# Patient Record
Sex: MALE | Race: WHITE | NOT HISPANIC OR LATINO | Employment: FULL TIME | URBAN - METROPOLITAN AREA
[De-identification: names, ages, dates, MRNs, and addresses within clinical notes are randomized per-mention and may not be internally consistent; named-entity substitution may affect disease eponyms.]

---

## 2018-02-08 ENCOUNTER — AMBULATORY - HEALTHEAST (OUTPATIENT)
Dept: INTERNAL MEDICINE | Facility: CLINIC | Age: 78
End: 2018-02-08

## 2018-02-09 ENCOUNTER — AMBULATORY - HEALTHEAST (OUTPATIENT)
Dept: INTERNAL MEDICINE | Facility: CLINIC | Age: 78
End: 2018-02-09

## 2018-02-09 ENCOUNTER — OFFICE VISIT - HEALTHEAST (OUTPATIENT)
Dept: INTERNAL MEDICINE | Facility: CLINIC | Age: 78
End: 2018-02-09

## 2018-02-09 DIAGNOSIS — E03.9 HYPOTHYROID: ICD-10-CM

## 2018-02-09 DIAGNOSIS — I44.7 LEFT BUNDLE BRANCH BLOCK: ICD-10-CM

## 2018-02-09 DIAGNOSIS — Z12.5 SCREENING FOR PROSTATE CANCER: ICD-10-CM

## 2018-02-09 DIAGNOSIS — Z00.00 ROUTINE GENERAL MEDICAL EXAMINATION AT A HEALTH CARE FACILITY: ICD-10-CM

## 2018-02-09 LAB
ALBUMIN SERPL-MCNC: 3.7 G/DL (ref 3.5–5)
ALBUMIN UR-MCNC: NEGATIVE MG/DL
ALP SERPL-CCNC: 54 U/L (ref 45–120)
ALT SERPL W P-5'-P-CCNC: 14 U/L (ref 0–45)
ANION GAP SERPL CALCULATED.3IONS-SCNC: 9 MMOL/L (ref 5–18)
APPEARANCE UR: CLEAR
AST SERPL W P-5'-P-CCNC: 19 U/L (ref 0–40)
ATRIAL RATE - MUSE: 65 BPM
BACTERIA #/AREA URNS HPF: ABNORMAL HPF
BILIRUB SERPL-MCNC: 1.2 MG/DL (ref 0–1)
BILIRUB UR QL STRIP: NEGATIVE
BUN SERPL-MCNC: 16 MG/DL (ref 8–28)
CALCIUM SERPL-MCNC: 9 MG/DL (ref 8.5–10.5)
CHLORIDE BLD-SCNC: 107 MMOL/L (ref 98–107)
CHOLEST SERPL-MCNC: 216 MG/DL
CO2 SERPL-SCNC: 26 MMOL/L (ref 22–31)
COLOR UR AUTO: YELLOW
CREAT SERPL-MCNC: 0.94 MG/DL (ref 0.7–1.3)
DIASTOLIC BLOOD PRESSURE - MUSE: NORMAL MMHG
ERYTHROCYTE [DISTWIDTH] IN BLOOD BY AUTOMATED COUNT: 11.5 % (ref 11–14.5)
FASTING STATUS PATIENT QL REPORTED: YES
GFR SERPL CREATININE-BSD FRML MDRD: >60 ML/MIN/1.73M2
GLUCOSE BLD-MCNC: 94 MG/DL (ref 70–125)
GLUCOSE UR STRIP-MCNC: NEGATIVE MG/DL
HCT VFR BLD AUTO: 47 % (ref 40–54)
HDLC SERPL-MCNC: 45 MG/DL
HGB BLD-MCNC: 15.8 G/DL (ref 14–18)
HGB UR QL STRIP: ABNORMAL
INTERPRETATION ECG - MUSE: NORMAL
KETONES UR STRIP-MCNC: NEGATIVE MG/DL
LDLC SERPL CALC-MCNC: 148 MG/DL
LEUKOCYTE ESTERASE UR QL STRIP: NEGATIVE
MCH RBC QN AUTO: 31.7 PG (ref 27–34)
MCHC RBC AUTO-ENTMCNC: 33.5 G/DL (ref 32–36)
MCV RBC AUTO: 94 FL (ref 80–100)
NITRATE UR QL: NEGATIVE
P AXIS - MUSE: 63 DEGREES
PH UR STRIP: 6 [PH] (ref 5–8)
PLATELET # BLD AUTO: 202 THOU/UL (ref 140–440)
PMV BLD AUTO: 7.7 FL (ref 7–10)
POTASSIUM BLD-SCNC: 4.3 MMOL/L (ref 3.5–5)
PR INTERVAL - MUSE: 174 MS
PROT SERPL-MCNC: 6.9 G/DL (ref 6–8)
PSA SERPL-MCNC: <0.1 NG/ML (ref 0–6.5)
QRS DURATION - MUSE: 158 MS
QT - MUSE: 440 MS
QTC - MUSE: 457 MS
R AXIS - MUSE: -25 DEGREES
RBC # BLD AUTO: 4.97 MILL/UL (ref 4.4–6.2)
RBC #/AREA URNS AUTO: ABNORMAL HPF
SODIUM SERPL-SCNC: 142 MMOL/L (ref 136–145)
SP GR UR STRIP: 1.02 (ref 1–1.03)
SQUAMOUS #/AREA URNS AUTO: ABNORMAL LPF
SYSTOLIC BLOOD PRESSURE - MUSE: NORMAL MMHG
T AXIS - MUSE: 49 DEGREES
TRIGL SERPL-MCNC: 113 MG/DL
TSH SERPL DL<=0.005 MIU/L-ACNC: 2.01 UIU/ML (ref 0.3–5)
UROBILINOGEN UR STRIP-ACNC: ABNORMAL
VENTRICULAR RATE- MUSE: 65 BPM
WBC #/AREA URNS AUTO: ABNORMAL HPF
WBC: 7.9 THOU/UL (ref 4–11)

## 2018-02-09 ASSESSMENT — MIFFLIN-ST. JEOR: SCORE: 1942.09

## 2018-02-15 ENCOUNTER — COMMUNICATION - HEALTHEAST (OUTPATIENT)
Dept: INTERNAL MEDICINE | Facility: CLINIC | Age: 78
End: 2018-02-15

## 2018-12-04 ENCOUNTER — RECORDS - HEALTHEAST (OUTPATIENT)
Dept: ADMINISTRATIVE | Facility: OTHER | Age: 78
End: 2018-12-04

## 2019-01-24 ENCOUNTER — RECORDS - HEALTHEAST (OUTPATIENT)
Dept: ADMINISTRATIVE | Facility: OTHER | Age: 79
End: 2019-01-24

## 2019-02-11 ENCOUNTER — OFFICE VISIT - HEALTHEAST (OUTPATIENT)
Dept: INTERNAL MEDICINE | Facility: CLINIC | Age: 79
End: 2019-02-11

## 2019-02-11 DIAGNOSIS — Z00.00 ROUTINE GENERAL MEDICAL EXAMINATION AT A HEALTH CARE FACILITY: ICD-10-CM

## 2019-02-11 DIAGNOSIS — Z12.5 SCREENING FOR PROSTATE CANCER: ICD-10-CM

## 2019-02-11 DIAGNOSIS — I44.7 LEFT BUNDLE BRANCH BLOCK: ICD-10-CM

## 2019-02-11 LAB
ALBUMIN SERPL-MCNC: 3.8 G/DL (ref 3.5–5)
ALBUMIN UR-MCNC: NEGATIVE MG/DL
ALP SERPL-CCNC: 52 U/L (ref 45–120)
ALT SERPL W P-5'-P-CCNC: 13 U/L (ref 0–45)
ANION GAP SERPL CALCULATED.3IONS-SCNC: 7 MMOL/L (ref 5–18)
APPEARANCE UR: CLEAR
AST SERPL W P-5'-P-CCNC: 18 U/L (ref 0–40)
ATRIAL RATE - MUSE: 63 BPM
BILIRUB SERPL-MCNC: 1.2 MG/DL (ref 0–1)
BILIRUB UR QL STRIP: ABNORMAL
BUN SERPL-MCNC: 17 MG/DL (ref 8–28)
CALCIUM SERPL-MCNC: 9.3 MG/DL (ref 8.5–10.5)
CHLORIDE BLD-SCNC: 106 MMOL/L (ref 98–107)
CHOLEST SERPL-MCNC: 190 MG/DL
CO2 SERPL-SCNC: 27 MMOL/L (ref 22–31)
COLOR UR AUTO: YELLOW
CREAT SERPL-MCNC: 1.02 MG/DL (ref 0.7–1.3)
DIASTOLIC BLOOD PRESSURE - MUSE: NORMAL MMHG
ERYTHROCYTE [DISTWIDTH] IN BLOOD BY AUTOMATED COUNT: 11.4 % (ref 11–14.5)
FASTING STATUS PATIENT QL REPORTED: YES
GFR SERPL CREATININE-BSD FRML MDRD: >60 ML/MIN/1.73M2
GLUCOSE BLD-MCNC: 98 MG/DL (ref 70–125)
GLUCOSE UR STRIP-MCNC: NEGATIVE MG/DL
HCT VFR BLD AUTO: 46.1 % (ref 40–54)
HDLC SERPL-MCNC: 48 MG/DL
HGB BLD-MCNC: 16.1 G/DL (ref 14–18)
HGB UR QL STRIP: NEGATIVE
INTERPRETATION ECG - MUSE: NORMAL
KETONES UR STRIP-MCNC: NEGATIVE MG/DL
LDLC SERPL CALC-MCNC: 124 MG/DL
LEUKOCYTE ESTERASE UR QL STRIP: NEGATIVE
MCH RBC QN AUTO: 32.1 PG (ref 27–34)
MCHC RBC AUTO-ENTMCNC: 35 G/DL (ref 32–36)
MCV RBC AUTO: 92 FL (ref 80–100)
NITRATE UR QL: NEGATIVE
P AXIS - MUSE: 70 DEGREES
PH UR STRIP: 7 [PH] (ref 5–8)
PLATELET # BLD AUTO: 203 THOU/UL (ref 140–440)
PMV BLD AUTO: 7.7 FL (ref 7–10)
POTASSIUM BLD-SCNC: 4.3 MMOL/L (ref 3.5–5)
PR INTERVAL - MUSE: 164 MS
PROT SERPL-MCNC: 6.8 G/DL (ref 6–8)
PSA SERPL-MCNC: <0.1 NG/ML (ref 0–6.5)
QRS DURATION - MUSE: 152 MS
QT - MUSE: 442 MS
QTC - MUSE: 452 MS
R AXIS - MUSE: -25 DEGREES
RBC # BLD AUTO: 5.02 MILL/UL (ref 4.4–6.2)
SODIUM SERPL-SCNC: 140 MMOL/L (ref 136–145)
SP GR UR STRIP: 1.02 (ref 1–1.03)
SYSTOLIC BLOOD PRESSURE - MUSE: NORMAL MMHG
T AXIS - MUSE: 76 DEGREES
TRIGL SERPL-MCNC: 89 MG/DL
TSH SERPL DL<=0.005 MIU/L-ACNC: 1.48 UIU/ML (ref 0.3–5)
UROBILINOGEN UR STRIP-ACNC: ABNORMAL
VENTRICULAR RATE- MUSE: 63 BPM
WBC: 6.5 THOU/UL (ref 4–11)

## 2019-02-11 ASSESSMENT — MIFFLIN-ST. JEOR: SCORE: 1909.77

## 2019-02-12 ENCOUNTER — COMMUNICATION - HEALTHEAST (OUTPATIENT)
Dept: INTERNAL MEDICINE | Facility: CLINIC | Age: 79
End: 2019-02-12

## 2019-07-25 ENCOUNTER — RECORDS - HEALTHEAST (OUTPATIENT)
Dept: ADMINISTRATIVE | Facility: OTHER | Age: 79
End: 2019-07-25

## 2019-11-06 ENCOUNTER — COMMUNICATION - HEALTHEAST (OUTPATIENT)
Dept: SCHEDULING | Facility: CLINIC | Age: 79
End: 2019-11-06

## 2019-11-11 ENCOUNTER — RECORDS - HEALTHEAST (OUTPATIENT)
Dept: ADMINISTRATIVE | Facility: OTHER | Age: 79
End: 2019-11-11

## 2019-11-27 ENCOUNTER — RECORDS - HEALTHEAST (OUTPATIENT)
Dept: ADMINISTRATIVE | Facility: OTHER | Age: 79
End: 2019-11-27

## 2020-02-07 ENCOUNTER — RECORDS - HEALTHEAST (OUTPATIENT)
Dept: ADMINISTRATIVE | Facility: OTHER | Age: 80
End: 2020-02-07

## 2020-02-14 ENCOUNTER — OFFICE VISIT - HEALTHEAST (OUTPATIENT)
Dept: INTERNAL MEDICINE | Facility: CLINIC | Age: 80
End: 2020-02-14

## 2020-02-14 DIAGNOSIS — Z12.5 SCREENING FOR PROSTATE CANCER: ICD-10-CM

## 2020-02-14 DIAGNOSIS — Z00.00 ROUTINE GENERAL MEDICAL EXAMINATION AT A HEALTH CARE FACILITY: ICD-10-CM

## 2020-02-14 DIAGNOSIS — I15.9 SECONDARY HYPERTENSION: ICD-10-CM

## 2020-02-14 DIAGNOSIS — E03.9 HYPOTHYROIDISM, UNSPECIFIED TYPE: ICD-10-CM

## 2020-02-14 DIAGNOSIS — I44.7 LEFT BUNDLE BRANCH BLOCK: ICD-10-CM

## 2020-02-14 LAB
ALBUMIN SERPL-MCNC: 4.2 G/DL (ref 3.5–5)
ALBUMIN UR-MCNC: NEGATIVE MG/DL
ALP SERPL-CCNC: 63 U/L (ref 45–120)
ALT SERPL W P-5'-P-CCNC: 14 U/L (ref 0–45)
ANION GAP SERPL CALCULATED.3IONS-SCNC: 9 MMOL/L (ref 5–18)
APPEARANCE UR: CLEAR
AST SERPL W P-5'-P-CCNC: 20 U/L (ref 0–40)
ATRIAL RATE - MUSE: 62 BPM
BACTERIA #/AREA URNS HPF: ABNORMAL HPF
BILIRUB SERPL-MCNC: 1.3 MG/DL (ref 0–1)
BILIRUB UR QL STRIP: NEGATIVE
BUN SERPL-MCNC: 19 MG/DL (ref 8–28)
CALCIUM SERPL-MCNC: 9.3 MG/DL (ref 8.5–10.5)
CHLORIDE BLD-SCNC: 103 MMOL/L (ref 98–107)
CHOLEST SERPL-MCNC: 224 MG/DL
CO2 SERPL-SCNC: 27 MMOL/L (ref 22–31)
COLOR UR AUTO: YELLOW
CREAT SERPL-MCNC: 1.01 MG/DL (ref 0.7–1.3)
DIASTOLIC BLOOD PRESSURE - MUSE: NORMAL
ERYTHROCYTE [DISTWIDTH] IN BLOOD BY AUTOMATED COUNT: 11.6 % (ref 11–14.5)
FASTING STATUS PATIENT QL REPORTED: YES
GFR SERPL CREATININE-BSD FRML MDRD: >60 ML/MIN/1.73M2
GLUCOSE BLD-MCNC: 88 MG/DL (ref 70–125)
GLUCOSE UR STRIP-MCNC: NEGATIVE MG/DL
HCT VFR BLD AUTO: 47.4 % (ref 40–54)
HDLC SERPL-MCNC: 51 MG/DL
HGB BLD-MCNC: 16.1 G/DL (ref 14–18)
HGB UR QL STRIP: ABNORMAL
INTERPRETATION ECG - MUSE: NORMAL
KETONES UR STRIP-MCNC: NEGATIVE MG/DL
LDLC SERPL CALC-MCNC: 155 MG/DL
LEUKOCYTE ESTERASE UR QL STRIP: NEGATIVE
MCH RBC QN AUTO: 32.9 PG (ref 27–34)
MCHC RBC AUTO-ENTMCNC: 34 G/DL (ref 32–36)
MCV RBC AUTO: 97 FL (ref 80–100)
NITRATE UR QL: NEGATIVE
P AXIS - MUSE: 75 DEGREES
PH UR STRIP: 6 [PH] (ref 5–8)
PLATELET # BLD AUTO: 210 THOU/UL (ref 140–440)
PMV BLD AUTO: 7.6 FL (ref 7–10)
POTASSIUM BLD-SCNC: 4.6 MMOL/L (ref 3.5–5)
PR INTERVAL - MUSE: 158 MS
PROT SERPL-MCNC: 7.1 G/DL (ref 6–8)
PSA SERPL-MCNC: <0.1 NG/ML (ref 0–6.5)
QRS DURATION - MUSE: 152 MS
QT - MUSE: 450 MS
QTC - MUSE: 456 MS
R AXIS - MUSE: -20 DEGREES
RBC # BLD AUTO: 4.9 MILL/UL (ref 4.4–6.2)
RBC #/AREA URNS AUTO: ABNORMAL HPF
SODIUM SERPL-SCNC: 139 MMOL/L (ref 136–145)
SP GR UR STRIP: 1.02 (ref 1–1.03)
SQUAMOUS #/AREA URNS AUTO: ABNORMAL LPF
SYSTOLIC BLOOD PRESSURE - MUSE: NORMAL
T AXIS - MUSE: 2 DEGREES
TRIGL SERPL-MCNC: 91 MG/DL
TSH SERPL DL<=0.005 MIU/L-ACNC: 2.17 UIU/ML (ref 0.3–5)
UROBILINOGEN UR STRIP-ACNC: ABNORMAL
VENTRICULAR RATE- MUSE: 62 BPM
WBC #/AREA URNS AUTO: ABNORMAL HPF
WBC: 7.8 THOU/UL (ref 4–11)

## 2020-02-14 ASSESSMENT — ANXIETY QUESTIONNAIRES
1. FEELING NERVOUS, ANXIOUS, OR ON EDGE: NOT AT ALL
2. NOT BEING ABLE TO STOP OR CONTROL WORRYING: NOT AT ALL

## 2020-02-14 ASSESSMENT — MIFFLIN-ST. JEOR: SCORE: 1860.44

## 2020-02-17 ENCOUNTER — COMMUNICATION - HEALTHEAST (OUTPATIENT)
Dept: INTERNAL MEDICINE | Facility: CLINIC | Age: 80
End: 2020-02-17

## 2020-04-10 ENCOUNTER — COMMUNICATION - HEALTHEAST (OUTPATIENT)
Dept: INTERNAL MEDICINE | Facility: CLINIC | Age: 80
End: 2020-04-10

## 2020-04-10 DIAGNOSIS — Z00.00 ROUTINE GENERAL MEDICAL EXAMINATION AT A HEALTH CARE FACILITY: ICD-10-CM

## 2020-08-06 ENCOUNTER — RECORDS - HEALTHEAST (OUTPATIENT)
Dept: ADMINISTRATIVE | Facility: OTHER | Age: 80
End: 2020-08-06

## 2020-10-29 ENCOUNTER — COMMUNICATION - HEALTHEAST (OUTPATIENT)
Dept: INTERNAL MEDICINE | Facility: CLINIC | Age: 80
End: 2020-10-29

## 2020-10-29 ENCOUNTER — OFFICE VISIT - HEALTHEAST (OUTPATIENT)
Dept: INTERNAL MEDICINE | Facility: CLINIC | Age: 80
End: 2020-10-29

## 2020-10-29 DIAGNOSIS — G44.209 TENSION HEADACHE: ICD-10-CM

## 2020-10-29 LAB — ERYTHROCYTE [SEDIMENTATION RATE] IN BLOOD BY WESTERGREN METHOD: 7 MM/HR (ref 0–15)

## 2020-10-29 ASSESSMENT — MIFFLIN-ST. JEOR: SCORE: 1837.76

## 2020-11-05 ENCOUNTER — COMMUNICATION - HEALTHEAST (OUTPATIENT)
Dept: INTERNAL MEDICINE | Facility: CLINIC | Age: 80
End: 2020-11-05

## 2020-11-05 ENCOUNTER — AMBULATORY - HEALTHEAST (OUTPATIENT)
Dept: INTERNAL MEDICINE | Facility: CLINIC | Age: 80
End: 2020-11-05

## 2020-11-05 DIAGNOSIS — G44.209 TENSION HEADACHE: ICD-10-CM

## 2020-11-06 ENCOUNTER — HOSPITAL ENCOUNTER (OUTPATIENT)
Dept: MRI IMAGING | Facility: CLINIC | Age: 80
Discharge: HOME | End: 2020-11-06
Attending: INTERNAL MEDICINE

## 2020-11-06 DIAGNOSIS — G44.209 TENSION HEADACHE: ICD-10-CM

## 2020-11-06 LAB
CREAT BLD-MCNC: 1.3 MG/DL (ref 0.7–1.3)
GFR SERPL CREATININE-BSD FRML MDRD: 53 ML/MIN/1.73M2

## 2020-11-30 ENCOUNTER — OFFICE VISIT - HEALTHEAST (OUTPATIENT)
Dept: INTERNAL MEDICINE | Facility: CLINIC | Age: 80
End: 2020-11-30

## 2020-11-30 DIAGNOSIS — G44.209 TENSION HEADACHE: ICD-10-CM

## 2020-12-21 ENCOUNTER — VIRTUAL VISIT (OUTPATIENT)
Dept: NEUROLOGY | Facility: CLINIC | Age: 80
End: 2020-12-21
Payer: MEDICARE

## 2020-12-21 ENCOUNTER — RECORDS - HEALTHEAST (OUTPATIENT)
Dept: ADMINISTRATIVE | Facility: OTHER | Age: 80
End: 2020-12-21

## 2020-12-21 VITALS — BODY MASS INDEX: 28.6 KG/M2 | WEIGHT: 230 LBS | HEIGHT: 75 IN

## 2020-12-21 DIAGNOSIS — G44.309 POST-TRAUMATIC HEADACHE, NOT INTRACTABLE, UNSPECIFIED CHRONICITY PATTERN: Primary | ICD-10-CM

## 2020-12-21 DIAGNOSIS — R29.818 OTHER SYMPTOMS AND SIGNS INVOLVING THE NERVOUS SYSTEM: ICD-10-CM

## 2020-12-21 PROCEDURE — 99203 OFFICE O/P NEW LOW 30 MIN: CPT | Mod: 95 | Performed by: PSYCHIATRY & NEUROLOGY

## 2020-12-21 RX ORDER — ROSUVASTATIN CALCIUM 5 MG/1
5 TABLET, COATED ORAL DAILY
COMMUNITY
Start: 2020-04-10 | End: 2022-05-15

## 2020-12-21 RX ORDER — OMEPRAZOLE 40 MG/1
40 CAPSULE, DELAYED RELEASE ORAL DAILY
COMMUNITY
Start: 2020-05-08 | End: 2023-01-06

## 2020-12-21 SDOH — HEALTH STABILITY: MENTAL HEALTH: HOW OFTEN DO YOU HAVE A DRINK CONTAINING ALCOHOL?: NEVER

## 2020-12-21 ASSESSMENT — MIFFLIN-ST. JEOR: SCORE: 1838.9

## 2020-12-21 NOTE — NURSING NOTE
Smart phone video visit [phone]  Patient's wife Sandra is with patient.  Chief Complaint   Patient presents with     Headache     Follow up MRI results.     Clara Olmos RN on 12/21/2020 at 12:23 PM
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
## 2020-12-22 ENCOUNTER — RECORDS - HEALTHEAST (OUTPATIENT)
Dept: ADMINISTRATIVE | Facility: OTHER | Age: 80
End: 2020-12-22

## 2020-12-22 NOTE — PATIENT INSTRUCTIONS
As we discussed, it is good news that the MRI of the brain showed no evidence of stroke, mass lesion or hemorrhage.  Your headaches also sound to have improved.  Your sedimentation rate was normal at 7 and with the improvement of headaches, would make the possibility of temporal arteritis unlikely.  I did put an order in place to do an MRA of the neck vessels.  That would be to make sure there was no injury to the carotid arteries or vertebral arteries causing a dissection of the artery that can cause headaches.  If you wish to proceed with that test, you can call my office and we will get it arranged.

## 2021-01-18 ENCOUNTER — HOSPITAL ENCOUNTER (OUTPATIENT)
Dept: MRI IMAGING | Facility: CLINIC | Age: 81
Discharge: HOME | End: 2021-01-18

## 2021-01-18 DIAGNOSIS — G44.309 POST-TRAUMATIC HEADACHE, NOT INTRACTABLE, UNSPECIFIED CHRONICITY PATTERN: ICD-10-CM

## 2021-01-18 DIAGNOSIS — R29.818 OTHER SYMPTOMS AND SIGNS INVOLVING THE NERVOUS SYSTEM: ICD-10-CM

## 2021-01-18 LAB
CREAT BLD-MCNC: 1.1 MG/DL (ref 0.7–1.3)
GFR SERPL CREATININE-BSD FRML MDRD: >60 ML/MIN/1.73M2

## 2021-02-15 ENCOUNTER — OFFICE VISIT - HEALTHEAST (OUTPATIENT)
Dept: INTERNAL MEDICINE | Facility: CLINIC | Age: 81
End: 2021-02-15

## 2021-02-15 DIAGNOSIS — I44.7 LEFT BUNDLE BRANCH BLOCK: ICD-10-CM

## 2021-02-15 DIAGNOSIS — Z00.00 ROUTINE GENERAL MEDICAL EXAMINATION AT A HEALTH CARE FACILITY: ICD-10-CM

## 2021-02-15 DIAGNOSIS — Z12.5 SCREENING FOR PROSTATE CANCER: ICD-10-CM

## 2021-02-15 LAB
ALBUMIN SERPL-MCNC: 4 G/DL (ref 3.5–5)
ALBUMIN UR-MCNC: NEGATIVE MG/DL
ALP SERPL-CCNC: 56 U/L (ref 45–120)
ALT SERPL W P-5'-P-CCNC: 12 U/L (ref 0–45)
ANION GAP SERPL CALCULATED.3IONS-SCNC: 8 MMOL/L (ref 5–18)
APPEARANCE UR: CLEAR
AST SERPL W P-5'-P-CCNC: 19 U/L (ref 0–40)
ATRIAL RATE - MUSE: 65 BPM
BILIRUB SERPL-MCNC: 1.1 MG/DL (ref 0–1)
BILIRUB UR QL STRIP: ABNORMAL
BUN SERPL-MCNC: 27 MG/DL (ref 8–28)
CALCIUM SERPL-MCNC: 8.7 MG/DL (ref 8.5–10.5)
CHLORIDE BLD-SCNC: 104 MMOL/L (ref 98–107)
CHOLEST SERPL-MCNC: 152 MG/DL
CO2 SERPL-SCNC: 26 MMOL/L (ref 22–31)
COLOR UR AUTO: YELLOW
CREAT SERPL-MCNC: 1.18 MG/DL (ref 0.7–1.3)
DIASTOLIC BLOOD PRESSURE - MUSE: NORMAL
ERYTHROCYTE [DISTWIDTH] IN BLOOD BY AUTOMATED COUNT: 11.8 % (ref 11–14.5)
FASTING STATUS PATIENT QL REPORTED: YES
GFR SERPL CREATININE-BSD FRML MDRD: 59 ML/MIN/1.73M2
GLUCOSE BLD-MCNC: 87 MG/DL (ref 70–125)
GLUCOSE UR STRIP-MCNC: NEGATIVE MG/DL
HCT VFR BLD AUTO: 43 % (ref 40–54)
HDLC SERPL-MCNC: 50 MG/DL
HGB BLD-MCNC: 14.7 G/DL (ref 14–18)
HGB UR QL STRIP: NEGATIVE
INTERPRETATION ECG - MUSE: NORMAL
KETONES UR STRIP-MCNC: NEGATIVE MG/DL
LDLC SERPL CALC-MCNC: 89 MG/DL
LEUKOCYTE ESTERASE UR QL STRIP: NEGATIVE
MCH RBC QN AUTO: 32.1 PG (ref 27–34)
MCHC RBC AUTO-ENTMCNC: 34.2 G/DL (ref 32–36)
MCV RBC AUTO: 94 FL (ref 80–100)
NITRATE UR QL: NEGATIVE
P AXIS - MUSE: 71 DEGREES
PH UR STRIP: 5.5 [PH] (ref 5–8)
PLATELET # BLD AUTO: 203 THOU/UL (ref 140–440)
PMV BLD AUTO: 10.1 FL (ref 7–10)
POTASSIUM BLD-SCNC: 4.4 MMOL/L (ref 3.5–5)
PR INTERVAL - MUSE: 176 MS
PROT SERPL-MCNC: 6.7 G/DL (ref 6–8)
PSA SERPL-MCNC: <0.1 NG/ML (ref 0–6.5)
QRS DURATION - MUSE: 158 MS
QT - MUSE: 438 MS
QTC - MUSE: 455 MS
R AXIS - MUSE: -27 DEGREES
RBC # BLD AUTO: 4.58 MILL/UL (ref 4.4–6.2)
SODIUM SERPL-SCNC: 138 MMOL/L (ref 136–145)
SP GR UR STRIP: 1.02 (ref 1–1.03)
SYSTOLIC BLOOD PRESSURE - MUSE: NORMAL
T AXIS - MUSE: 88 DEGREES
TRIGL SERPL-MCNC: 64 MG/DL
TSH SERPL DL<=0.005 MIU/L-ACNC: 2.09 UIU/ML (ref 0.3–5)
UROBILINOGEN UR STRIP-ACNC: ABNORMAL
VENTRICULAR RATE- MUSE: 65 BPM
WBC: 7.2 THOU/UL (ref 4–11)

## 2021-02-15 ASSESSMENT — ANXIETY QUESTIONNAIRES
2. NOT BEING ABLE TO STOP OR CONTROL WORRYING: NOT AT ALL
1. FEELING NERVOUS, ANXIOUS, OR ON EDGE: NOT AT ALL

## 2021-02-15 ASSESSMENT — MIFFLIN-ST. JEOR: SCORE: 1838.9

## 2021-02-16 ENCOUNTER — COMMUNICATION - HEALTHEAST (OUTPATIENT)
Dept: INTERNAL MEDICINE | Facility: CLINIC | Age: 81
End: 2021-02-16

## 2021-03-16 ENCOUNTER — RECORDS - HEALTHEAST (OUTPATIENT)
Dept: ADMINISTRATIVE | Facility: OTHER | Age: 81
End: 2021-03-16

## 2021-04-15 ENCOUNTER — COMMUNICATION - HEALTHEAST (OUTPATIENT)
Dept: INTERNAL MEDICINE | Facility: CLINIC | Age: 81
End: 2021-04-15

## 2021-04-15 DIAGNOSIS — Z00.00 ROUTINE GENERAL MEDICAL EXAMINATION AT A HEALTH CARE FACILITY: ICD-10-CM

## 2021-05-25 ENCOUNTER — RECORDS - HEALTHEAST (OUTPATIENT)
Dept: ADMINISTRATIVE | Facility: CLINIC | Age: 81
End: 2021-05-25

## 2021-06-01 VITALS — HEIGHT: 76 IN | BODY MASS INDEX: 30.56 KG/M2 | WEIGHT: 251 LBS

## 2021-06-02 VITALS — WEIGHT: 243 LBS | BODY MASS INDEX: 29.59 KG/M2 | HEIGHT: 76 IN

## 2021-06-03 NOTE — TELEPHONE ENCOUNTER
Had a colonoscopy in July. Couldn't see the upper part of the colon.  Now he states I've been using some laxatives because of difficult bowel movements, and I had a lot of black stool today , that seems like it was the reason they could not see the upper part of my bowel.  No bright red bleeding at all. Just Black stool.    No abdominal pain. No fever's.  would like to make an appointment with Dr. Ann.  Patient is willing to see someone else instead of Dr. Ann.  Patient advised to be seen soon.    Patient was scheduled to see Dr. Justice  @ 3:00pm on 11/07/2019      Shahana Butler RN  [facility] Triage/refill nurse    Reason for Disposition    Patient wants to be seen    Protocols used: CONSTIPATION-A-[place]

## 2021-06-04 VITALS
HEART RATE: 60 BPM | BODY MASS INDEX: 28.37 KG/M2 | WEIGHT: 233 LBS | OXYGEN SATURATION: 97 % | DIASTOLIC BLOOD PRESSURE: 74 MMHG | HEIGHT: 76 IN | SYSTOLIC BLOOD PRESSURE: 136 MMHG

## 2021-06-05 VITALS
HEIGHT: 76 IN | OXYGEN SATURATION: 97 % | WEIGHT: 228 LBS | HEART RATE: 61 BPM | BODY MASS INDEX: 27.76 KG/M2 | DIASTOLIC BLOOD PRESSURE: 78 MMHG | SYSTOLIC BLOOD PRESSURE: 124 MMHG

## 2021-06-05 VITALS
BODY MASS INDEX: 28.6 KG/M2 | HEART RATE: 91 BPM | TEMPERATURE: 97.2 F | SYSTOLIC BLOOD PRESSURE: 134 MMHG | OXYGEN SATURATION: 99 % | DIASTOLIC BLOOD PRESSURE: 78 MMHG | HEIGHT: 75 IN | WEIGHT: 230 LBS

## 2021-06-06 NOTE — PROGRESS NOTES
Annual wellness visit  Assessment and Plan:   Annual wellness visit    1. Left bundle branch block  Annual wellness visit  - Electrocardiogram Perform and Read  - HM2(CBC w/o Differential)  - Comprehensive Metabolic Panel  - Lipid Cascade  - Thyroid Stimulating Hormone (TSH)  - Urinalysis-UC if Indicated  - PSA (Prostatic-Specific Antigen), Annual Screen    2. Routine general medical examination at a health care facility  Annual wellness visit  - 2(CBC w/o Differential)  - Comprehensive Metabolic Panel  - Lipid Cascade  - Thyroid Stimulating Hormone (TSH)  - Urinalysis-UC if Indicated    3. Screening for prostate cancer  Annual wellness visit and screen for prostate cancer.  - PSA (Prostatic-Specific Antigen), Annual Screen    4. Secondary hypertension  Annual wellness visit  - HM2(CBC w/o Differential)  - Comprehensive Metabolic Panel  - Lipid Cascade  - Thyroid Stimulating Hormone (TSH)  - Urinalysis-UC if Indicated  - PSA (Prostatic-Specific Antigen), Annual Screen    5. Hypothyroidism, unspecified type  Annual wellness visit.  - HM2(CBC w/o Differential)  - Comprehensive Metabolic Panel  - Lipid Cascade  - Thyroid Stimulating Hormone (TSH)  - Urinalysis-UC if Indicated  - PSA (Prostatic-Specific Antigen), Annual Screen     The patient's current medical problems were reviewed.    I have had an Advance Directives discussion with the patient.  The following health maintenance schedule was reviewed with the patient and provided in printed form in the after visit summary:   Health Maintenance   Topic Date Due     ZOSTER VACCINES (1 of 2) 07/22/1990     PNEUMOCOCCAL IMMUNIZATION 65+ LOW/MEDIUM RISK (2 of 2 - PPSV23) 11/09/2016     INFLUENZA VACCINE RULE BASED (1) 08/01/2019     FALL RISK ASSESSMENT  02/11/2020     MEDICARE ANNUAL WELLNESS VISIT  02/11/2020     TD 18+ HE  11/04/2023     LIPID  02/11/2024     ADVANCE CARE PLANNING  02/11/2024        Subjective:   Chief Complaint: Emiliano HENDERSON Radharadha . is an 79 y.o. male  here for an Annual Wellness visit.   HPI: Annual wellness visit physical exam and screen for prostate cancer for this 79-year-old .  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    Uses omeprazole 40 mg daily recent diagnosis of Blevins's esophagus.  He was given the name of Dr. Ronald Banks [facility] for consultation at this regard.    Colonoscopy dated 2019 showed a hyperplastic polyp with background diverticulosis and internal hemorrhoids.    The patient is a non-smoker he does not use alcohol to excess.  In the past he has had a left bundle branch block with a full work-up at [facility].  This was a full cardiac work-up he denies chest pain shortness of breath or exertional syncope.    In 1998 he had an open prostatectomy by Dr. YUSUF of [place] [facility] for prostate cancer thankfully there is been no sign of recurrence.    Father  prostate cancer age 84.    Mother  89 after hip fracture.  7 children 21 grandchildren.  One child is a [facility] cardiologist a female.  All of his children have technical engineering degrees.  He is a  still practicing.  Electrical engineering degree and a PhD in a masters degree in philosophy from [employer].  Anticipating [employer] but is yet to do so.    Review of Systems:    Please see above.  The rest of the review of systems are negative for all systems.    Patient Care Team:  Casey Ann MD as PCP - General  Casey Ann MD as Assigned PCP     Patient Active Problem List   Diagnosis     Adenocarcinoma Of The Prostate Gland     Left Bundle Branch Block     History reviewed. No pertinent past medical history.   History reviewed. No pertinent surgical history.   History reviewed. No pertinent family history.   Social History     Socioeconomic History     Marital status:      Spouse name: Not on file     Number of children: Not on  "file     Years of education: Not on file     Highest education level: Not on file   Occupational History     Not on file   Social Needs     Financial resource strain: Not on file     Food insecurity:     Worry: Not on file     Inability: Not on file     Transportation needs:     Medical: Not on file     Non-medical: Not on file   Tobacco Use     Smoking status: Never Smoker     Smokeless tobacco: Never Used   Substance and Sexual Activity     Alcohol use: No     Drug use: No     Sexual activity: Not on file   Lifestyle     Physical activity:     Days per week: Not on file     Minutes per session: Not on file     Stress: Not on file   Relationships     Social connections:     Talks on phone: Not on file     Gets together: Not on file     Attends [employer] service: Not on file     Active member of club or organization: Not on file     Attends meetings of clubs or organizations: Not on file     Relationship status: Not on file     Intimate partner violence:     Fear of current or ex partner: Not on file     Emotionally abused: Not on file     Physically abused: Not on file     Forced sexual activity: Not on file   Other Topics Concern     Not on file   Social History Narrative     Not on file      Current Outpatient Medications   Medication Sig Dispense Refill     omeprazole (PRILOSEC) 40 MG capsule Take 40 mg by mouth daily before breakfast.       No current facility-administered medications for this visit.       Objective:   Vital Signs:   Visit Vitals  /74 (Patient Site: Right Arm, Patient Position: Sitting)   Pulse 60   Ht 6' 3.5\" (1.918 m)   Wt (!) 233 lb (105.7 kg)   SpO2 97%   BMI 28.74 kg/m         VisionScreening:  No exam data present     PHYSICAL EXAM  Chest clear to auscultation and percussion.  Heart tones regular rhythm without murmur rub or gallop.  Abdomen soft nontender no organomegaly.  No peritoneal signs.  Extremities free of edema cyanosis or clubbing.  Neck veins nondistended no thyromegaly " or scleral icterus noted, carotids full.  Skin warm and dry easily conversant good spirited.  Normal intelligence.  Neurologically intact no gross localizing findings.  Skin negative lymph negative neuro negative psych normal HEENT negative back straight no severe spine tenderness genital rectal exam negative prostate tissue not palpable is status post prostatectomy in referral umbilical prostatectomy scar midline well-healed good pulse noted all 4 extremities no carotid bruits or thyromegaly.  Rest of examination negative in its entirety.    Assessment Results 2/14/2020   Activities of Daily Living No help needed   Instrumental Activities of Daily Living No help needed   Get Up and Go Score Less than 12 seconds   Mini Cog Total Score 5   Some recent data might be hidden     A Mini-Cog score of 0-2 suggests the possibility of dementia, score of 3-5 suggests no dementia    Identified Health Risks:     Information regarding advance directives (living lim), including where he can download the appropriate form, was provided to the patient via the AVS.

## 2021-06-07 NOTE — TELEPHONE ENCOUNTER
Medication Request  Medication name:   Rosuvastatin (CRESTOR) 5 mg  90 Tablet, One tablet daily  See Letter dated 2/17/2020  Requested Pharmacy: [id number]  Reason for request:   Patient request for Provider recommended medication.  When did you use medication last?:    N/A  Patient offered appointment:  No  Okay to leave a detailed message: yes

## 2021-06-07 NOTE — TELEPHONE ENCOUNTER
Please call patient and pharmacy okay for rosuvastatin 5 mg tablets number 90 tablets take 1 tablet daily with 3 refills.  Please call patient and his pharmacy.

## 2021-06-07 NOTE — TELEPHONE ENCOUNTER
Spoke with the patient's wife and relayed message below from Dr. Ann.  She verbalized understanding and had no further questions at this time.    Prescription has been set up for Dr. Ann to review per message below.  Coral ZIMMER, NIKO/CMT....................10:47 AM
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
## 2021-06-13 NOTE — PROGRESS NOTES
"Emiliano Talley Jr. is a 80 y.o. male who is being evaluated via a billable telephone visit.      The patient has been notified of following:     \"This telephone visit will be conducted via a call between you and your physician/provider. We have found that certain health care needs can be provided without the need for a physical exam.  This service lets us provide the care you need with a short phone conversation.  If a prescription is necessary we can send it directly to your pharmacy.  If lab work is needed we can place an order for that and you can then stop by our lab to have the test done at a later time.    Telephone visits are billed at different rates depending on your insurance coverage. During this emergency period, for some insurers they may be billed the same as an in-person visit.  Please reach out to your insurance provider with any questions.    If during the course of the call the physician/provider feels a telephone visit is not appropriate, you will not be charged for this service.\"    Patient has given verbal consent to a Telephone visit? Yes    What phone number would you like to be contacted at? [phone]    Patient would like to receive their AVS by AVS Preference: Mail a copy.    Additional provider notes: Headache.    Recent MRI of head with and without contrast allCLEAR.    Sedimentation rate done on October 29, 2020 measured 7.  Upcoming appointment with Dr. LOU of neurology is already set up.  I encouraged the patient to keep that.  The patient's headache is better.  It is mild now    Assessment/Plan:  Headache of uncertain etiology rule out tension.  Normal sed rate normal MRI of head is reassuring I gave these results to the patient.  Upcoming appointment with Dr. LOU of neurology encouraged patient to keep.  Despite the fact that his headache is slightly better.  Return to clinic as needed.      Phone call duration:  11 minutes    Kristine Rivas CMA  "

## 2021-06-14 ENCOUNTER — RECORDS - HEALTHEAST (OUTPATIENT)
Dept: ADMINISTRATIVE | Facility: OTHER | Age: 81
End: 2021-06-14

## 2021-06-15 NOTE — PROGRESS NOTES
Assessment and Plan:   Annual wellness visit and physical exam.  Fasting    1. Left bundle branch block  Annual wellness visit and physical exam.  Prior history of left bundle branch block  - Electrocardiogram Perform and Read  - HM2(CBC w/o Differential)  - Comprehensive Metabolic Panel  - Lipid Cascade  - Thyroid Stimulating Hormone (TSH)  - Urinalysis-UC if Indicated  - PSA (Prostatic-Specific Antigen), Annual Screen  - Electrocardiogram Perform - Clinic    2. Routine general medical examination at a health care facility  Annual wellness visit and physical exam.  History of left bundle branch block.  Hypothyroidism.  - HM2(CBC w/o Differential)  - Comprehensive Metabolic Panel  - Lipid Cascade  - Thyroid Stimulating Hormone (TSH)  - Urinalysis-UC if Indicated  - PSA (Prostatic-Specific Antigen), Annual Screen  - Electrocardiogram Perform - Clinic    3. Screening for prostate cancer  Annual wellness visit and physical exam.  History of left bundle branch block and hypothyroidism and history of prostate cancer.  Screen for prostate cancer.  - HM2(CBC w/o Differential)  - Comprehensive Metabolic Panel  - Lipid Cascade  - Thyroid Stimulating Hormone (TSH)  - Urinalysis-UC if Indicated  - PSA (Prostatic-Specific Antigen), Annual Screen  - Electrocardiogram Perform - Clinic    4. Hypothyroid  Hypothyroidism and annual wellness visit and physical exam.  History of prostate cancer.  And left bundle branch block.  - HM2(CBC w/o Differential)  - Comprehensive Metabolic Panel  - Lipid Cascade  - Thyroid Stimulating Hormone (TSH)  - Urinalysis-UC if Indicated  - PSA (Prostatic-Specific Antigen), Annual Screen  - Electrocardiogram Perform - Clinic     The patient's current medical problems were reviewed.    I have had an Advance Directives discussion with the patient.  The following health maintenance schedule was reviewed with the patient and provided in printed form in the after visit summary:   Health Maintenance   Topic  Date Due     ZOSTER VACCINE  2000     INFLUENZA VACCINE RULE BASED (1) 2017     FALL RISK ASSESSMENT  2019     ADVANCE DIRECTIVES DISCUSSED WITH PATIENT  2020     TD 18+ HE  2023     PNEUMOCOCCAL POLYSACCHARIDE VACCINE AGE 65 AND OVER  Completed     PNEUMOCOCCAL CONJUGATE VACCINE FOR ADULTS (PCV13 OR PREVNAR)  Completed        Subjective:   Chief Complaint: Emiliano Talley Jr. is an 77 y.o. male here for an Annual Wellness visit.   HPI: 77-year-old  here for annual wellness visit and physical exam.  Still working.  Non-smoker no alcohol.  Colonoscopy normal 2009.  No known drug allergies.    Immunizations reviewed and up-to-date.    Full cardiac workup in the past for left bundle branch block at [facility] in the [facility] negative.    Prostate cancer with open prostatectomy  [facility].  No sign of recurrence.    Father  prostate cancer age 84.    Mother  age 89 after a fractured hip.  7 children 20 grandchildren.  One child is a [facility] cardiologist.    .  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     by trade still working.    Review of Systems:    Please see above.  The rest of the review of systems are negative for all systems.    Patient Care Team:  Casey Ann MD as PCP - General     Patient Active Problem List   Diagnosis     Adenocarcinoma Of The Prostate Gland     Left Bundle Branch Block     No past medical history on file.   No past surgical history on file.   No family history on file.   Social History     Social History     Marital status:      Spouse name: N/A     Number of children: N/A     Years of education: N/A     Occupational History     Not on file.     Social History Main Topics     Smoking status: Never Smoker     Smokeless tobacco: Never  "Used     Alcohol use No     Drug use: No     Sexual activity: Not on file     Other Topics Concern     Not on file     Social History Narrative      No current outpatient prescriptions on file.     No current facility-administered medications for this visit.       Objective:   Vital Signs:   Visit Vitals     /68 (Patient Site: Right Arm, Patient Position: Sitting)     Pulse 64     Ht 6' 3.5\" (1.918 m)     Wt (!) 251 lb (113.9 kg)     SpO2 98%     BMI 30.96 kg/m2        VisionScreening:  No exam data present     PHYSICAL EXAM  Chest clear to auscultation and percussion.  Heart tones regular rhythm without murmur rub or gallop.  Abdomen soft nontender no organomegaly.  No peritoneal signs.  Extremities free of edema cyanosis or clubbing.  Neck veins nondistended no thyromegaly or scleral icterus noted, carotids full.  Skin warm and dry easily conversant good spirited.  Normal intelligence.  Neurologically intact no gross localizing findings.  Rest of examination negative in its entirety including skin negative lymph negative neuro negative psych normal HEENT negative back straight no severe spine tenderness general rectal exam negative prostate small without nodularity induration nothing to suggest malignancy good pulses in all 4 extremities.  Benign moles.  Unchanged.    Assessment Results 2/9/2018   Activities of Daily Living No help needed   Instrumental Activities of Daily Living No help needed   Get Up and Go Score Less than 12 seconds   Mini Cog Total Score 5   Some recent data might be hidden     A Mini-Cog score of 0-2 suggests the possibility of dementia, score of 3-5 suggests no dementia    Identified Health Risks:     He is at risk for falling and has been provided with information to reduce the risk of falling at home.  Information regarding advance directives (living lim), including where he can download the appropriate form, was provided to the patient via the AVS.       "

## 2021-06-15 NOTE — PROGRESS NOTES
Assessment and Plan:   Annual wellness visit  Patient has been advised of split billing requirements and indicates understanding: Yes  1. Left bundle branch block  Annual wellness visit and history of left bundle branch block with full work-up at [facility] a number of years ago by cardiology team. The patient is asymptomatic in terms of no chest pain shortness of breath no syncope with exercise and no palpitations. The patient exercises vigorously at least 3 or 4 times per week without symptoms. Left bundle branch block is same stable. Sinus mechanism rate 65. No acute changes on today's EKG.  - Electrocardiogram Perform and Read    2. Routine general medical examination at a health care facility  Annual wellness visit and physical exam.  - HM2(CBC w/o Differential)  - Comprehensive Metabolic Panel  - Lipid Cascade  - Thyroid Stimulating Hormone (TSH)  - Urinalysis-UC if Indicated    3. Screening for prostate cancer  Annual wellness visit and history of prostate cancer status post prostatectomy 1998 with Dr. YUSUF at [facility] clinically no sign of recurrence. Open prostatectomy at that time.  - PSA (Prostatic-Specific Antigen), Annual Screen     The patient's current medical problems were reviewed.    I have had an Advance Directives discussion with the patient.  The following health maintenance schedule was reviewed with the patient and provided in printed form in the after visit summary:   Health Maintenance   Topic Date Due     COVID-19 Vaccine (1 of 2) 07/22/1956     ZOSTER VACCINES (1 of 2) 07/22/1990     Pneumococcal Vaccine: 65+ Years (2 of 2 - PPSV23) 11/09/2016     INFLUENZA VACCINE RULE BASED (1) 08/01/2020     MEDICARE ANNUAL WELLNESS VISIT  02/15/2022     FALL RISK ASSESSMENT  02/15/2022     TD 18+ HE  11/04/2023     LIPID  02/14/2025     ADVANCE CARE PLANNING  02/14/2025     Pneumococcal Vaccine: Pediatrics (0 to 5 Years) and At-Risk Patients (6 to 64 Years)  Aged Out     HEPATITIS B  VACCINES  Aged Out        Subjective:   Chief Complaint: Emiliano Talley Jr. is an 80 y.o. male here for an Annual Wellness visit.   HPI: Annual wellness visit and physical examination. Colonoscopy showed a redundant colon allCLEAR 2020 with [facility]. Non-smoker no excess alcohol. Still practicing [employer]. Undergraduate degree in electrical engineering from [employer]. Masters degree in PhD in philosophy from [employer]. Law degree from [employer] in [place]. Practicing [employer]  still working.    History of Blevins's esophagus followed by Dr. Banks at [facility]. 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    Non-smoker no excess alcohol. Father  of prostate cancer age 84.    Other  age 89 after hip fracture.    Father does 7 children 21 grandchildren. All children have technical engineering degrees. 1 child a daughter is a leading cardiologist at the [employer]. Wife is well. Anticipates FDC but has yet to do so. .    Review of Systems:    Please see above.  The rest of the review of systems are negative for all systems.    Patient Care Team:  Casey Ann MD as PCP - General  Casey Ann MD as Assigned PCP     Patient Active Problem List   Diagnosis     Adenocarcinoma Of The Prostate Gland     Left Bundle Branch Block     No past medical history on file.   No past surgical history on file.   No family history on file.   Social History     Socioeconomic History     Marital status:      Spouse name: Not on file     Number of children: Not on file     Years of education: Not on file     Highest education level: Not on file   Occupational History     Not on file   Social Needs     Financial resource strain: Not on file     Food insecurity     Worry: Not on file     Inability: Not  "on file     Transportation needs     Medical: Not on file     Non-medical: Not on file   Tobacco Use     Smoking status: Never Smoker     Smokeless tobacco: Never Used   Substance and Sexual Activity     Alcohol use: No     Drug use: No     Sexual activity: Not on file   Lifestyle     Physical activity     Days per week: Not on file     Minutes per session: Not on file     Stress: Not on file   Relationships     Social connections     Talks on phone: Not on file     Gets together: Not on file     Attends Taoism service: Not on file     Active member of club or organization: Not on file     Attends meetings of clubs or organizations: Not on file     Relationship status: Not on file     Intimate partner violence     Fear of current or ex partner: Not on file     Emotionally abused: Not on file     Physically abused: Not on file     Forced sexual activity: Not on file   Other Topics Concern     Not on file   Social History Narrative     Not on file      Current Outpatient Medications   Medication Sig Dispense Refill     omeprazole (PRILOSEC) 40 MG capsule Take 40 mg by mouth daily before breakfast.       rosuvastatin (CRESTOR) 5 MG tablet Take 1 tablet (5 mg total) by mouth daily. 90 tablet 3     No current facility-administered medications for this visit.       Objective:   Vital Signs:   Visit Vitals  /78   Pulse 91   Temp 97.2  F (36.2  C)   Ht 6' 3\" (1.905 m)   Wt (!) 230 lb (104.3 kg)   SpO2 99%   BMI 28.75 kg/m           VisionScreening:  No exam data present     PHYSICAL EXAM  Chest clear to auscultation and percussion.  Heart tones regular rhythm without murmur rub or gallop.  Abdomen soft nontender no organomegaly.  No peritoneal signs.  Extremities free of edema cyanosis or clubbing.  Neck veins nondistended no thyromegaly or scleral icterus noted, carotids full.  Skin warm and dry easily conversant good spirited.  Normal intelligence.  Neurologically intact no gross localizing findings. GEN negative " lymph negative neuro negative psych normal HEENT negative dense cataracts noted bilaterally left side more than right side suggest Dr. Enriquez at the [employer] [facility] for surgical treatment of same good pulse noted in all 4 extremities no carotid bruits or thyromegaly.    75-1/2 inches tall he is 230 pounds his BMI is 29. Recheck blood pressure 134/78 pulse 72 respirations 18 O2 sats 99% on room air temperature this afternoon 97.2  F he appeared well easily conversant good spirited highly intelligent. Not in acute distress not acutely or chronically ill appearing appears younger than stated age.    Assessment Results 2/15/2021   Activities of Daily Living No help needed   Instrumental Activities of Daily Living No help needed   Get Up and Go Score Less than 12 seconds   Mini Cog Total Score 5   Some recent data might be hidden     A Mini-Cog score of 0-2 suggests the possibility of dementia, score of 3-5 suggests no dementia    Identified Health Risks:     Information regarding advance directives (living lim), including where he can download the appropriate form, was provided to the patient via the AVS.

## 2021-06-17 NOTE — PATIENT INSTRUCTIONS - HE
Patient Instructions by Casey Ann MD at 2/11/2019  9:20 AM     Author: Casey Ann MD Service: -- Author Type: Physician    Filed: 2/11/2019  9:48 AM Encounter Date: 2/11/2019 Status: Signed    : Casey Ann MD (Physician)         Patient Education   Understanding Advance Care Planning  Advance care planning is the process of deciding ones own future medical care. It helps ensure that if you cant speak for yourself, your wishes can still be carried out. The plan is a series of legal documents that note a persons wishes. The documents vary by state. Advance care planning may be done when a person has a serious illness that is expected to get worse. It may be done before major surgery. And it can help you and your family be prepared in case of a major illness or injury. Advance care planning helps with making decisions at these times.       A health care proxy is a person who acts as the voice of a patient when the patient cant speak for himself or herself. The name of this role varies by state. It may be called a Durable Medical Power of  or Durable Power of  for Healthcare. It may be called an agent, surrogate, or advocate. Or it may be called a representative or decision maker. It is an official duty that is identified by a legal document. The document also varies by state.    Why Is Advance Care Planning Important?  If a person communicates their healthcare wishes:    They will be given medical care that matches their values and goals.    Their family members will not be forced to make decisions in a crisis with no guidance.  Creating a Plan  Making an advance care plan is often done in 3 steps:    Thinking about ones wishes. To create an advance care plan, you should think about what kind of medical treatment you would want if you lose the ability to communicate. Are there any situations in which you would refuse or stop treatment? Are there therapies you would  want or not want? And whom do you want to make decisions for you? There are many places to learn more about how to plan for your care. Ask your doctor or  for resources.    Picking a health care proxy. This means choosing a trusted person to speak for you only when you cant speak for yourself. When you cannot make medical decisions, your proxy makes sure the instructions in your advance care plan are followed. A proxy does not make decisions based on his or her own opinions. They must put aside those opinions and values if needed, and carry out your wishes.    Filling out the legal documents. There are several kinds of legal documents for advance care planning. Each one tells health care providers your wishes. The documents may vary by state. They must be signed and may need to be witnessed or notarized. You can cancel or change them whenever you wish. Depending on your state, the documents may include a Healthcare Proxy form, Living Will, Durable Medical Power of , Advance Directive, or others.  The Familys Role  The best help a family can give is to support their loved ones wishes. Open and honest communication is vital. Family should express any concerns they have about the patients choices while the patient can still make decisions.    2595-5008 [employer]. [address] [place] [address], [place] [address]. All rights reserved. This information is not intended as a substitute for professional medical care. Always follow your healthcare professional's instructions.         Also, [facility] [place] offers a free, downloadable health care directive that allows you to share your treatment choices and personal preferences if you cannot communicate your wishes. It also allows you to appoint another person (called a health care agent) to make health care decisions if you are unable to do so. You can download an advance directive by going here:  [email or web address]     Patient Education   Personalized Prevention Plan  You are due for the preventive services outlined below.  Your care team is available to assist you in scheduling these services.  If you have already completed any of these items, please share that information with your care team to update in your medical record.  Health Maintenance   Topic Date Due   ? ZOSTER VACCINES (1 of 2) 07/22/1990   ? INFLUENZA VACCINE RULE BASED (1) 08/01/2018   ? FALL RISK ASSESSMENT  02/11/2020   ? ADVANCE DIRECTIVES DISCUSSED WITH PATIENT  02/09/2023   ? TD 18+ HE  11/04/2023   ? PNEUMOCOCCAL POLYSACCHARIDE VACCINE AGE 65 AND OVER  Completed   ? PNEUMOCOCCAL CONJUGATE VACCINE FOR ADULTS (PCV13 OR PREVNAR)  Completed

## 2021-06-18 NOTE — LETTER
Letter by Casey Ann MD at      Author: Casey Ann MD Service: -- Author Type: --    Filed:  Encounter Date: 2/12/2019 Status: (Other)       Emiliano Talley Jr.  [address]  [place] [address]             February 12, 2019         Dear Mr. Talley,    Below are the results from your recent visit:    Resulted Orders   HM2(CBC w/o Differential)   Result Value Ref Range    WBC 6.5 4.0 - 11.0 thou/uL    RBC 5.02 4.40 - 6.20 mill/uL    Hemoglobin 16.1 14.0 - 18.0 g/dL    Hematocrit 46.1 40.0 - 54.0 %    MCV 92 80 - 100 fL    MCH 32.1 27.0 - 34.0 pg    MCHC 35.0 32.0 - 36.0 g/dL    RDW 11.4 11.0 - 14.5 %    Platelets 203 140 - 440 thou/uL    MPV 7.7 7.0 - 10.0 fL   Comprehensive Metabolic Panel   Result Value Ref Range    Sodium 140 136 - 145 mmol/L    Potassium 4.3 3.5 - 5.0 mmol/L    Chloride 106 98 - 107 mmol/L    CO2 27 22 - 31 mmol/L    Anion Gap, Calculation 7 5 - 18 mmol/L    Glucose 98 70 - 125 mg/dL    BUN 17 8 - 28 mg/dL    Creatinine 1.02 0.70 - 1.30 mg/dL    GFR MDRD Af Amer >60 >60 mL/min/1.73m2    GFR MDRD Non Af Amer >60 >60 mL/min/1.73m2    Bilirubin, Total 1.2 (H) 0.0 - 1.0 mg/dL    Calcium 9.3 8.5 - 10.5 mg/dL    Protein, Total 6.8 6.0 - 8.0 g/dL    Albumin 3.8 3.5 - 5.0 g/dL    Alkaline Phosphatase 52 45 - 120 U/L    AST 18 0 - 40 U/L    ALT 13 0 - 45 U/L    Narrative    Fasting Glucose reference range is 70-99 mg/dL per  American Diabetes Association (ADA) guidelines.   Lipid Cascade   Result Value Ref Range    Cholesterol 190 <=199 mg/dL    Triglycerides 89 <=149 mg/dL    HDL Cholesterol 48 >=40 mg/dL    LDL Calculated 124 <=129 mg/dL    Patient Fasting > 8hrs? Yes    Thyroid Stimulating Hormone (TSH)   Result Value Ref Range    TSH 1.48 0.30 - 5.00 uIU/mL   Urinalysis-UC if Indicated   Result Value Ref Range    Color, UA Yellow Colorless, Yellow, Straw, Light Yellow    Clarity, UA Clear Clear    Glucose, UA Negative Negative    Bilirubin, UA Small (!) Negative    Ketones, UA  Negative Negative    Specific Gravity, UA 1.020 1.005 - 1.030    Blood, UA Negative Negative    pH, UA 7.0 5.0 - 8.0    Protein, UA Negative Negative mg/dL    Urobilinogen, UA 1.0 E.U./dL 0.2 E.U./dL, 1.0 E.U./dL    Nitrite, UA Negative Negative    Leukocytes, UA Negative Negative    Narrative    Microscopic not indicated  UC not indicated   PSA (Prostatic-Specific Antigen), Annual Screen   Result Value Ref Range    PSA <0.1 0.0 - 6.5 ng/mL    Narrative    Method is Abbott Prostate-Specific Antigen (PSA)  Standard-WHO 1st International (90:10)       All very good results and no sign of prostate cancer recurrence.    Please call with questions or contact us using [employer].    Sincerely,        Electronically signed by Casey Ann MD

## 2021-06-18 NOTE — PATIENT INSTRUCTIONS - HE
Patient Instructions by Casey Ann MD at 2/14/2020  9:00 AM     Author: Casey Ann MD Service: -- Author Type: Physician    Filed: 2/14/2020  9:36 AM Encounter Date: 2/14/2020 Status: Signed    : Casey Ann MD (Physician)         Patient Education   Understanding Advance Care Planning  Advance care planning is the process of deciding ones own future medical care. It helps ensure that if you cant speak for yourself, your wishes can still be carried out. The plan is a series of legal documents that note a persons wishes. The documents vary by state. Advance care planning may be done when a person has a serious illness that is expected to get worse. It may be done before major surgery. And it can help you and your family be prepared in case of a major illness or injury. Advance care planning helps with making decisions at these times.       A health care proxy is a person who acts as the voice of a patient when the patient cant speak for himself or herself. The name of this role varies by state. It may be called a Durable Medical Power of  or Durable Power of  for Healthcare. It may be called an agent, surrogate, or advocate. Or it may be called a representative or decision maker. It is an official duty that is identified by a legal document. The document also varies by state.    Why Is Advance Care Planning Important?  If a person communicates their healthcare wishes:    They will be given medical care that matches their values and goals.    Their family members will not be forced to make decisions in a crisis with no guidance.  Creating a Plan  Making an advance care plan is often done in 3 steps:    Thinking about ones wishes. To create an advance care plan, you should think about what kind of medical treatment you would want if you lose the ability to communicate. Are there any situations in which you would refuse or stop treatment? Are there therapies you would  want or not want? And whom do you want to make decisions for you? There are many places to learn more about how to plan for your care. Ask your doctor or  for resources.    Picking a health care proxy. This means choosing a trusted person to speak for you only when you cant speak for yourself. When you cannot make medical decisions, your proxy makes sure the instructions in your advance care plan are followed. A proxy does not make decisions based on his or her own opinions. They must put aside those opinions and values if needed, and carry out your wishes.    Filling out the legal documents. There are several kinds of legal documents for advance care planning. Each one tells health care providers your wishes. The documents may vary by state. They must be signed and may need to be witnessed or notarized. You can cancel or change them whenever you wish. Depending on your state, the documents may include a Healthcare Proxy form, Living Will, Durable Medical Power of , Advance Directive, or others.  The Familys Role  The best help a family can give is to support their loved ones wishes. Open and honest communication is vital. Family should express any concerns they have about the patients choices while the patient can still make decisions.    7402-6786 [employer]. [address] [place] [address], [place] [address]. All rights reserved. This information is not intended as a substitute for professional medical care. Always follow your healthcare professional's instructions.         Also, [employer] [place] offers a free, downloadable health care directive that allows you to share your treatment choices and personal preferences if you cannot communicate your wishes. It also allows you to appoint another person (called a health care agent) to make health care decisions if you are unable to do so. You can download an advance directive by going here:  [email or web address]     Patient Education   Personalized Prevention Plan  You are due for the preventive services outlined below.  Your care team is available to assist you in scheduling these services.  If you have already completed any of these items, please share that information with your care team to update in your medical record.  Health Maintenance   Topic Date Due   ? ZOSTER VACCINES (1 of 2) 07/22/1990   ? PNEUMOCOCCAL IMMUNIZATION 65+ LOW/MEDIUM RISK (2 of 2 - PPSV23) 11/09/2016   ? INFLUENZA VACCINE RULE BASED (1) 08/01/2019   ? FALL RISK ASSESSMENT  02/11/2020   ? MEDICARE ANNUAL WELLNESS VISIT  02/11/2020   ? TD 18+ HE  11/04/2023   ? LIPID  02/11/2024   ? ADVANCE CARE PLANNING  02/11/2024

## 2021-06-20 NOTE — LETTER
Letter by Casey Ann MD at      Author: Casey Ann MD Service: -- Author Type: --    Filed:  Encounter Date: 2/17/2020 Status: (Other)         Emiliano Talley Jr.  [address]  [place] [address]             February 17, 2020         Dear Mr. Talley,    Below are the results from your recent visit:    Resulted Orders   HM2(CBC w/o Differential)   Result Value Ref Range    WBC 7.8 4.0 - 11.0 thou/uL    RBC 4.90 4.40 - 6.20 mill/uL    Hemoglobin 16.1 14.0 - 18.0 g/dL    Hematocrit 47.4 40.0 - 54.0 %    MCV 97 80 - 100 fL    MCH 32.9 27.0 - 34.0 pg    MCHC 34.0 32.0 - 36.0 g/dL    RDW 11.6 11.0 - 14.5 %    Platelets 210 140 - 440 thou/uL    MPV 7.6 7.0 - 10.0 fL   Comprehensive Metabolic Panel   Result Value Ref Range    Sodium 139 136 - 145 mmol/L    Potassium 4.6 3.5 - 5.0 mmol/L    Chloride 103 98 - 107 mmol/L    CO2 27 22 - 31 mmol/L    Anion Gap, Calculation 9 5 - 18 mmol/L    Glucose 88 70 - 125 mg/dL    BUN 19 8 - 28 mg/dL    Creatinine 1.01 0.70 - 1.30 mg/dL    GFR MDRD Af Amer >60 >60 mL/min/1.73m2    GFR MDRD Non Af Amer >60 >60 mL/min/1.73m2    Bilirubin, Total 1.3 (H) 0.0 - 1.0 mg/dL    Calcium 9.3 8.5 - 10.5 mg/dL    Protein, Total 7.1 6.0 - 8.0 g/dL    Albumin 4.2 3.5 - 5.0 g/dL    Alkaline Phosphatase 63 45 - 120 U/L    AST 20 0 - 40 U/L    ALT 14 0 - 45 U/L    Narrative    Fasting Glucose reference range is 70-99 mg/dL per  American Diabetes Association (ADA) guidelines.   Lipid Cascade   Result Value Ref Range    Cholesterol 224 (H) <=199 mg/dL    Triglycerides 91 <=149 mg/dL    HDL Cholesterol 51 >=40 mg/dL    LDL Calculated 155 (H) <=129 mg/dL    Patient Fasting > 8hrs? Yes    Thyroid Stimulating Hormone (TSH)   Result Value Ref Range    TSH 2.17 0.30 - 5.00 uIU/mL   Urinalysis-UC if Indicated   Result Value Ref Range    Color, UA Yellow Colorless, Yellow, Straw, Light Yellow    Clarity, UA Clear Clear    Glucose, UA Negative Negative    Bilirubin, UA Negative Negative     Ketones, UA Negative Negative    Specific Gravity, UA 1.020 1.005 - 1.030    Blood, UA Trace (!) Negative    pH, UA 6.0 5.0 - 8.0    Protein, UA Negative Negative mg/dL    Urobilinogen, UA 1.0 E.U./dL 0.2 E.U./dL, 1.0 E.U./dL    Nitrite, UA Negative Negative    Leukocytes, UA Negative Negative    Bacteria, UA None Seen None Seen hpf    RBC, UA 0-2 None Seen, 0-2 hpf    WBC, UA None Seen None Seen, 0-5 hpf    Squam Epithel, UA None Seen None Seen, 0-5 lpf    Narrative    UC not indicated   PSA (Prostatic-Specific Antigen), Annual Screen   Result Value Ref Range    PSA <0.1 0.0 - 6.5 ng/mL    Narrative    Method is Abbott Prostate-Specific Antigen (PSA)  Standard-WHO 1st International (90:10)       All very good results but lipids are too high and would suggest rosuvastatin 5 mg tablets number 100 tablets take 1 tablet daily  Will send Emiliano Low fat diet and will think about starting a statin    Please call with questions or contact us using [employer].    Sincerely,        Electronically signed by Casey Ann MD

## 2021-06-21 NOTE — LETTER
Letter by Casey Ann MD at      Author: Casey Ann MD Service: -- Author Type: --    Filed:  Encounter Date: 10/29/2020 Status: (Other)         Emiliano Talley Jr.  [address]  [place] [address]             October 29, 2020         Dear Mr. Talley,    Below are the results from your recent visit:    Resulted Orders   Erythrocyte Sedimentation Rate   Result Value Ref Range    Sed Rate 7 0 - 15 mm/hr       All very good results and no sign of cranial arteritis or temporal arteritis.     Please call with questions or contact us using [facility].    Sincerely,        Electronically signed by Casey Ann MD

## 2021-06-23 NOTE — PROGRESS NOTES
Assessment and Plan:   Annual wellness visit    1. Left bundle branch block  Annual wellness visit and history of left bundle branch block with negative cardiac workup [facility] in the remote past.  Asymptomatic.  - Electrocardiogram Perform and Read    2. Routine general medical examination at a health care facility  Annual wellness visit.  - HM2(CBC w/o Differential)  - Comprehensive Metabolic Panel  - Lipid Cascade  - Thyroid Stimulating Hormone (TSH)  - Urinalysis-UC if Indicated    3. Screening for prostate cancer  Annual wellness visit.  History of prostate cancer resected with open prostatectomy in February 1998.  No sign of recurrence.  Clinically.  Patient requested PSA check.  - PSA (Prostatic-Specific Antigen), Annual Screen     The patient's current medical problems were reviewed.    I have had an Advance Directives discussion with the patient.  The following health maintenance schedule was reviewed with the patient and provided in printed form in the after visit summary:   Health Maintenance   Topic Date Due     ZOSTER VACCINES (1 of 2) 07/22/1990     INFLUENZA VACCINE RULE BASED (1) 08/01/2018     FALL RISK ASSESSMENT  02/11/2020     ADVANCE DIRECTIVES DISCUSSED WITH PATIENT  02/09/2023     TD 18+ HE  11/04/2023     PNEUMOCOCCAL POLYSACCHARIDE VACCINE AGE 65 AND OVER  Completed     PNEUMOCOCCAL CONJUGATE VACCINE FOR ADULTS (PCV13 OR PREVNAR)  Completed        Subjective:   Chief Complaint: Emiliano Talley Jr. is an 78 y.o. male here for an Annual Wellness visit.   HPI: Annual wellness visit and physical exam for this 78-year-old  who anticipates [facility] soon.    Immunizations reviewed and up-to-date.    Colonoscopy dated November 20, 2009 normal.    Non-smoker no alcohol.  No known drug allergies.    Full cardiac workup in the past for left bundle branch block [facility] negative.    Open prostatectomy in 1998 in February of that year with Dr. YUSUF [facility] [place] [facility] no  "sign of recurrence.    Father  prostate cancer 84.    Mother  89 after a fractured hip.  7 children well 21 grandchildren.  One child is [facility] cardiologist a female.    .    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  Degree ultimately from [employer].   anticipates nursing home soon.    Review of Systems:    Please see above.  The rest of the review of systems are negative for all systems.    Patient Care Team:  Casey Ann MD as PCP - General     Patient Active Problem List   Diagnosis     Adenocarcinoma Of The Prostate Gland     Left Bundle Branch Block     History reviewed. No pertinent past medical history.   History reviewed. No pertinent surgical history.   History reviewed. No pertinent family history.   Social History     Socioeconomic History     Marital status:      Spouse name: Not on file     Number of children: Not on file     Years of education: Not on file     Highest education level: Not on file   Social Needs     Financial resource strain: Not on file     Food insecurity - worry: Not on file     Food insecurity - inability: Not on file     Transportation needs - medical: Not on file     Transportation needs - non-medical: Not on file   Occupational History     Not on file   Tobacco Use     Smoking status: Never Smoker     Smokeless tobacco: Never Used   Substance and Sexual Activity     Alcohol use: No     Drug use: No     Sexual activity: Not on file   Other Topics Concern     Not on file   Social History Narrative     Not on file      Current Outpatient Medications   Medication Sig Dispense Refill     atorvastatin (LIPITOR) 10 MG tablet Take 1 tablet (10 mg total) by mouth daily. 90 tablet 3     No current facility-administered medications for this visit.       Objective:   Vital Signs:   Visit Vitals  /74 (Patient Site: Right Arm, Patient Position: Sitting)   Pulse 64   Ht 6' 3.75\" " (1.924 m)   Wt (!) 243 lb (110.2 kg)   SpO2 97%   BMI 29.77 kg/m         VisionScreening:  No exam data present     PHYSICAL EXAM  Chest clear to auscultation and percussion.  Heart tones regular rhythm without murmur rub or gallop.  Abdomen soft nontender no organomegaly.  No peritoneal signs.  Extremities free of edema cyanosis or clubbing.  Neck veins nondistended no thyromegaly or scleral icterus noted, carotids full.  Skin warm and dry easily conversant good spirited.  Normal intelligence.  Neurologically intact no gross localizing findings.  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  Discussed in detail.  Good pulse noted in all 4 extremities no carotid bruits genital rectal exam negative.  Prostate tissue not palpable genital negative no groin hernias abdomen benign no carotid bruits thyromegaly eyes ears nose throat negative normocephalic.    Assessment Results 2/11/2019   Activities of Daily Living No help needed   Instrumental Activities of Daily Living No help needed   Get Up and Go Score Less than 12 seconds   Mini Cog Total Score 5   Some recent data might be hidden     A Mini-Cog score of 0-2 suggests the possibility of dementia, score of 3-5 suggests no dementia    Identified Health Risks:     Information regarding advance directives (living lim), including where he can download the appropriate form, was provided to the patient via the AVS.

## 2021-08-13 ENCOUNTER — OFFICE VISIT (OUTPATIENT)
Dept: INTERNAL MEDICINE | Facility: CLINIC | Age: 81
End: 2021-08-13
Payer: MEDICARE

## 2021-08-13 VITALS
SYSTOLIC BLOOD PRESSURE: 120 MMHG | WEIGHT: 232 LBS | DIASTOLIC BLOOD PRESSURE: 72 MMHG | HEART RATE: 68 BPM | BODY MASS INDEX: 28.85 KG/M2 | OXYGEN SATURATION: 95 % | HEIGHT: 75 IN

## 2021-08-13 DIAGNOSIS — K21.9 GASTRIC REFLUX: ICD-10-CM

## 2021-08-13 DIAGNOSIS — E78.00 HYPERCHOLESTEREMIA: ICD-10-CM

## 2021-08-13 DIAGNOSIS — H25.9 AGE-RELATED CATARACT OF BOTH EYES, UNSPECIFIED AGE-RELATED CATARACT TYPE: ICD-10-CM

## 2021-08-13 DIAGNOSIS — I44.7 LEFT BUNDLE BRANCH BLOCK: ICD-10-CM

## 2021-08-13 DIAGNOSIS — Z01.818 PREOPERATIVE EXAMINATION: ICD-10-CM

## 2021-08-13 PROCEDURE — 99214 OFFICE O/P EST MOD 30 MIN: CPT | Performed by: NURSE PRACTITIONER

## 2021-08-13 RX ORDER — FLUOCINOLONE ACETONIDE 0.11 MG/ML
OIL TOPICAL
COMMUNITY
Start: 2021-06-14 | End: 2022-11-04

## 2021-08-13 ASSESSMENT — MIFFLIN-ST. JEOR: SCORE: 1842.98

## 2021-08-13 NOTE — PROGRESS NOTES
[facility]  [address] [place] [id number]  Phone: [phone]  Fax: [phone]  Primary Provider: Casey Ann  Pre-op Performing Provider: ROSSY SANCHEZ    PREOPERATIVE EVALUATION:  Today's date: 8/13/2021    Emiliano Talley Jr. is a 81 year old male who presents for a preoperative evaluation.    Surgical Information:  Surgery/Procedure: Cataract surgery   Surgery Location: [facility]  Surgeon: Dr Baig  Surgery Date: 8/26, 9/16  Time of Surgery:   Where patient plans to recover: At home with family  Fax number for surgical facility: [facility] [phone]    Type of Anesthesia Anticipated: Topical    Assessment & Plan     The proposed surgical procedure is considered LOW risk.    Preoperative examination: Completed today. No EKG or labs needed.     Age-related cataract of both eyes, unspecified age-related cataract type: hx of this. Left first, then right will be surgically repaired.     Left bundle branch block: Hx of this. Last EKG done in 02/2021 showed a continued left bundle branch block. He is asymptomatic. Stable.     Hypercholesteremia: He continues on rosuvastatin. Stable.     Gastric reflux: Continues on omeprazole. Stable.     Risks and Recommendations:  The patient has the following additional risks and recommendations for perioperative complications:   - No identified additional risk factors other than previously addressed    Medication Instructions:  Patient is to take all scheduled medications on the day of surgery    RECOMMENDATION:  APPROVAL GIVEN to proceed with proposed procedure, without further diagnostic evaluation.    Subjective     HPI related to upcoming procedure: The patient presents today for a preop physical.    He will be having cataract surgery done to help with his vision. His left eye is worse than his right. He has had vision issues the last couple of years.    He denies any new concerns today. He is a patient  of Dr. Ann.    Labs were last done on 02/15/21; were all essentially normal. EKG done on 02/15/21 showed a left bundle branch block, chronic in nature.    He reports that he does not need a COVID test prior, has received both COVID shots.    He is only on rosuvastatin and omeprazole.    No history of MI, or ZORAIDA.    He is a full code.     Preop Questions 8/13/2021   1. Have you ever had a heart attack or stroke? No   2. Have you ever had surgery on your heart or blood vessels, such as a stent placement, a coronary artery bypass, or surgery on an artery in your head, neck, heart, or legs? No   3. Do you have chest pain with activity? No   4. Do you have a history of  heart failure? No   5. Do you currently have a cold, bronchitis or symptoms of other infection? No   6. Do you have a cough, shortness of breath, or wheezing? No   7. Do you or anyone in your family have previous history of blood clots? No   8. Do you or does anyone in your family have a serious bleeding problem such as prolonged bleeding following surgeries or cuts? No   9. Have you ever had problems with anemia or been told to take iron pills? No   10. Have you had any abnormal blood loss such as black, tarry or bloody stools? No   11. Have you ever had a blood transfusion? No   12. Are you willing to have a blood transfusion if it is medically needed before, during, or after your surgery? Yes   13. Have you or any of your relatives ever had problems with anesthesia? No   14. Do you have sleep apnea, excessive snoring or daytime drowsiness? No   15. Do you have any artifical heart valves or other implanted medical devices like a pacemaker, defibrillator, or continuous glucose monitor? No   16. Do you have artificial joints? No   17. Are you allergic to latex? No       Health Care Directive:  Patient does not have a Health Care Directive or Living Will: Full code    Preoperative Review of :   reviewed - no record of controlled substances  "prescribed.     Review of Systems  CONSTITUTIONAL: NEGATIVE for fever, chills, change in weight  INTEGUMENTARY/SKIN: NEGATIVE for worrisome rashes, moles or lesions  EYES: NEGATIVE for vision changes or irritation  ENT/MOUTH: NEGATIVE for ear, mouth and throat problems  RESP: NEGATIVE for significant cough or SOB  CV: NEGATIVE for chest pain, palpitations or peripheral edema  GI: NEGATIVE for nausea, abdominal pain, heartburn, or change in bowel habits  : NEGATIVE for frequency, dysuria, or hematuria  MUSCULOSKELETAL: NEGATIVE for significant arthralgias or myalgia  NEURO: NEGATIVE for weakness, dizziness or paresthesias  ENDOCRINE: NEGATIVE for temperature intolerance, skin/hair changes  HEME: NEGATIVE for bleeding problems  PSYCHIATRIC: NEGATIVE for changes in mood or affect    Patient Active Problem List    Diagnosis Date Noted     Adenocarcinoma Of The Prostate Gland      Priority: Medium     Created by Conversion         Left Bundle Branch Block      Priority: Medium     Created by Conversion          No past medical history on file.  No past surgical history on file.  Current Outpatient Medications   Medication Sig Dispense Refill     fluocinolone acetonide (DERMA SMOOTHE/FS BODY) 0.01 % external oil        omeprazole (PRILOSEC) 40 MG DR capsule Take 40 mg by mouth daily       rosuvastatin (CRESTOR) 5 MG tablet Take 5 mg by mouth daily         No Known Allergies     Social History     Tobacco Use     Smoking status: Never Smoker     Smokeless tobacco: Never Used   Substance Use Topics     Alcohol use: No     Family History   Problem Relation Age of Onset     Cancer Father      History   Drug Use No         Objective     /72 (BP Location: Right arm, Patient Position: Sitting)   Pulse 68   Ht 1.905 m (6' 3\")   Wt 105.2 kg (232 lb)   SpO2 95%   BMI 29.00 kg/m      Physical Exam    GENERAL APPEARANCE: healthy, alert and no distress     EYES: EOMI,  PERRLA     HENT: ear canals and TM's normal and nose " and mouth without ulcers or lesions     NECK: no adenopathy, no asymmetry, masses, or scars and thyroid normal to palpation     RESP: lungs clear to auscultation - no rales, rhonchi or wheezes     CV: regular rates and rhythm, normal S1 S2, no S3 or S4 and no murmur, click or rub     MS: extremities normal- no gross deformities noted, no evidence of inflammation in joints, FROM in all extremities.     SKIN: no suspicious lesions or rashes     NEURO: Normal strength and tone, sensory exam grossly normal, mentation intact and speech normal     PSYCH: mentation appears normal. and affect normal/bright     LYMPHATICS: No cervical adenopathy    Recent Labs   Lab Test 02/15/21  1438 01/18/21  0703 02/14/20  0937   HGB 14.7  --  16.1     --  210     --  139   POTASSIUM 4.4  --  4.6   CR 1.18 1.1 1.01        Diagnostics:  No labs were ordered during this visit.   No EKG required for low risk surgery (cataract, skin procedure, breast biopsy, etc).    Revised Cardiac Risk Index (RCRI):  The patient has the following serious cardiovascular risks for perioperative complications:   - No serious cardiac risks = 0 points     RCRI Interpretation: 0 points: Class I (very low risk - 0.4% complication rate)         Signed Electronically by: Aria Juárez CNP  Copy of this evaluation report is provided to requesting physician.

## 2021-08-13 NOTE — PATIENT INSTRUCTIONS
No NSAIDS (Aleve, Advil, or Ibuprofen) prior to surgery.    Tylenol as needed for pain control.    Follow up prior to surgery if having new or developing symptoms.   Patient Education     What Are Cataracts?  The lens in the eye focuses light. This lets the eye see images sharply. Aging is the most common cause of cataracts. With age, the lens slowly becomes cloudy. The cloudy lens is a cataract. A cataract scatters light and makes it hard for the eye to focus. Cataracts often form in both eyes. But one lens may cloud faster than the other.    The aging of your lens  Your lens may cloud so slowly that you don`t notice any vision changes at first. But as the cataract gets worse, the eye has a harder time focusing. In early stages, glasses may help you see better. As the lens gets more cloudy, your healthcare provider may advise surgery to restore your vision.  A clear lens allows your eye to bring objects sharply into focus.  A mild cataract may slightly blur your vision.  A dense cataract can severely blur your vision.  [employer] last reviewed this educational content on 8/1/2020 2000-2021 [employer]. All rights reserved. This information is not intended as a substitute for professional medical care. Always follow your healthcare professional's instructions.

## 2022-02-02 NOTE — TELEPHONE ENCOUNTER
Left detailed message for the patient relaying message below from Dr. Ann.  Asked that he call with any further questions.  Coral ZIMMER, NIKO/LOLY....................11:41 AM    
Okay and I will send the order now.  
Who is calling:  Patient  Reason for Call:  Patient asked if Casey Ann MD would be willing to change his CT Head to a MRI Head. Patient stated he wants to avoid exposure to radiation as much as possible.  Date of last appointment with primary care: 10/29/20  Okay to leave a detailed message: Yes  [phone]    
normal affect/normal behavior

## 2022-02-17 ENCOUNTER — OFFICE VISIT (OUTPATIENT)
Dept: INTERNAL MEDICINE | Facility: CLINIC | Age: 82
End: 2022-02-17
Payer: MEDICARE

## 2022-02-17 VITALS
SYSTOLIC BLOOD PRESSURE: 138 MMHG | WEIGHT: 236 LBS | BODY MASS INDEX: 29.34 KG/M2 | DIASTOLIC BLOOD PRESSURE: 84 MMHG | OXYGEN SATURATION: 97 % | HEIGHT: 75 IN | HEART RATE: 60 BPM

## 2022-02-17 DIAGNOSIS — Z00.00 ROUTINE GENERAL MEDICAL EXAMINATION AT A HEALTH CARE FACILITY: Primary | ICD-10-CM

## 2022-02-17 DIAGNOSIS — Z12.5 SCREENING FOR PROSTATE CANCER: ICD-10-CM

## 2022-02-17 DIAGNOSIS — Z00.00 ENCOUNTER FOR MEDICARE ANNUAL WELLNESS EXAM: ICD-10-CM

## 2022-02-17 DIAGNOSIS — N18.31 CHRONIC KIDNEY DISEASE, STAGE 3A (H): ICD-10-CM

## 2022-02-17 DIAGNOSIS — I44.7 LEFT BUNDLE BRANCH BLOCK: ICD-10-CM

## 2022-02-17 LAB
ALBUMIN SERPL-MCNC: 4.1 G/DL (ref 3.5–5)
ALP SERPL-CCNC: 60 U/L (ref 45–120)
ALT SERPL W P-5'-P-CCNC: 27 U/L (ref 0–45)
ANION GAP SERPL CALCULATED.3IONS-SCNC: 9 MMOL/L (ref 5–18)
AST SERPL W P-5'-P-CCNC: 28 U/L (ref 0–40)
ATRIAL RATE - MUSE: 60 BPM
BILIRUB SERPL-MCNC: 1.3 MG/DL (ref 0–1)
BUN SERPL-MCNC: 22 MG/DL (ref 8–28)
CALCIUM SERPL-MCNC: 9.1 MG/DL (ref 8.5–10.5)
CHLORIDE BLD-SCNC: 105 MMOL/L (ref 98–107)
CHOLEST SERPL-MCNC: 148 MG/DL
CO2 SERPL-SCNC: 25 MMOL/L (ref 22–31)
CREAT SERPL-MCNC: 1.15 MG/DL (ref 0.7–1.3)
DIASTOLIC BLOOD PRESSURE - MUSE: NORMAL MMHG
ERYTHROCYTE [DISTWIDTH] IN BLOOD BY AUTOMATED COUNT: 11.6 % (ref 10–15)
FASTING STATUS PATIENT QL REPORTED: NORMAL
GFR SERPL CREATININE-BSD FRML MDRD: 64 ML/MIN/1.73M2
GLUCOSE BLD-MCNC: 88 MG/DL (ref 70–125)
HCT VFR BLD AUTO: 44.3 % (ref 40–53)
HDLC SERPL-MCNC: 52 MG/DL
HGB BLD-MCNC: 14.9 G/DL (ref 13.3–17.7)
INTERPRETATION ECG - MUSE: NORMAL
LDLC SERPL CALC-MCNC: 84 MG/DL
MCH RBC QN AUTO: 32.5 PG (ref 26.5–33)
MCHC RBC AUTO-ENTMCNC: 33.6 G/DL (ref 31.5–36.5)
MCV RBC AUTO: 97 FL (ref 78–100)
P AXIS - MUSE: 70 DEGREES
PLATELET # BLD AUTO: 188 10E3/UL (ref 150–450)
POTASSIUM BLD-SCNC: 4.6 MMOL/L (ref 3.5–5)
PR INTERVAL - MUSE: 184 MS
PROT SERPL-MCNC: 7 G/DL (ref 6–8)
PSA SERPL-MCNC: <0.1 UG/L (ref 0–6.5)
QRS DURATION - MUSE: 152 MS
QT - MUSE: 466 MS
QTC - MUSE: 466 MS
R AXIS - MUSE: -18 DEGREES
RBC # BLD AUTO: 4.59 10E6/UL (ref 4.4–5.9)
SODIUM SERPL-SCNC: 139 MMOL/L (ref 136–145)
SYSTOLIC BLOOD PRESSURE - MUSE: NORMAL MMHG
T AXIS - MUSE: 101 DEGREES
TRIGL SERPL-MCNC: 62 MG/DL
TSH SERPL DL<=0.005 MIU/L-ACNC: 2.23 UIU/ML (ref 0.3–5)
VENTRICULAR RATE- MUSE: 60 BPM
WBC # BLD AUTO: 8.7 10E3/UL (ref 4–11)

## 2022-02-17 PROCEDURE — 84443 ASSAY THYROID STIM HORMONE: CPT | Performed by: INTERNAL MEDICINE

## 2022-02-17 PROCEDURE — 80053 COMPREHEN METABOLIC PANEL: CPT | Performed by: INTERNAL MEDICINE

## 2022-02-17 PROCEDURE — G0103 PSA SCREENING: HCPCS | Performed by: INTERNAL MEDICINE

## 2022-02-17 PROCEDURE — 36415 COLL VENOUS BLD VENIPUNCTURE: CPT | Performed by: INTERNAL MEDICINE

## 2022-02-17 PROCEDURE — 93005 ELECTROCARDIOGRAM TRACING: CPT | Performed by: INTERNAL MEDICINE

## 2022-02-17 PROCEDURE — G0439 PPPS, SUBSEQ VISIT: HCPCS | Performed by: INTERNAL MEDICINE

## 2022-02-17 PROCEDURE — 85027 COMPLETE CBC AUTOMATED: CPT | Performed by: INTERNAL MEDICINE

## 2022-02-17 PROCEDURE — 80061 LIPID PANEL: CPT | Performed by: INTERNAL MEDICINE

## 2022-02-17 PROCEDURE — 93010 ELECTROCARDIOGRAM REPORT: CPT | Mod: OFF | Performed by: GENERAL ACUTE CARE HOSPITAL

## 2022-02-17 ASSESSMENT — ACTIVITIES OF DAILY LIVING (ADL): CURRENT_FUNCTION: NO ASSISTANCE NEEDED

## 2022-02-17 NOTE — PROGRESS NOTES
"SUBJECTIVE:   Emiliano Talley Jr. is a 81 year old male who presents for Preventive Visit.      Patient has been advised of split billing requirements and indicates understanding: Yes  Are you in the first 12 months of your Medicare coverage?  No    Healthy Habits:     In general, how would you rate your overall health?  Good    Frequency of exercise:  2-3 days/week    Duration of exercise:  15-30 minutes    Do you usually eat at least 4 servings of fruit and vegetables a day, include whole grains    & fiber and avoid regularly eating high fat or \"junk\" foods?  Yes    Taking medications regularly:  Yes    Medication side effects:  None    Ability to successfully perform activities of daily living:  No assistance needed    Home Safety:  No safety concerns identified    Hearing Impairment:  No hearing concerns    In the past 6 months, have you been bothered by leaking of urine?  No    In general, how would you rate your overall mental or emotional health?  Excellent      PHQ-2 Total Score: 0    Additional concerns today:  No    Do you feel safe in your environment? Yes    Have you ever done Advance Care Planning? (For example, a Health Directive, POLST, or a discussion with a medical provider or your loved ones about your wishes): No, advance care planning information given to patient to review.  Advanced care planning was discussed at today's visit.       Fall risk  none    {If any of the above assessments are answered yes, consider ordering appropriate referrals (Optional):887638::\"click delete button to remove this line now\"}  Cognitive Screening     Clock   Two   Words   Picture  Captain  And Garden    Do you have sleep apnea, excessive snoring or daytime drowsiness?: no    Reviewed and updated as needed this visit by clinical staff                  Reviewed and updated as needed this visit by Provider                 Social History     Tobacco Use     Smoking status: Never Smoker     Smokeless tobacco: Never Used " "  Substance Use Topics     Alcohol use: No         Alcohol Use 2022   Prescreen: >3 drinks/day or >7 drinks/week? No   {add AUDIT responses (Optional) (A score of 7 for adult men is an indication of hazardous drinking; a score of 8 or more is an indication of an alcohol use disorder.  A score of 7 or more for adult women is an indication of hazardous drinking or an alchohol use disorder):423685}    {Outside tests to abstract? :986386}    {additional problems to add (Optional):586583}    Current providers sharing in care for this patient include: {Rooming staff:  Please update Care Team in Rooming Activity, refresh this note and then delete this statement}  Patient Care Team:  Casey Ann MD as PCP - General (Internal Medicine)  Oscar Marx MD as MD (Neurology)  Oscar Marx MD as Assigned Neuroscience Provider  Casey Ann MD as Assigned PCP    The following health maintenance items are reviewed in Epic and correct as of today:  Health Maintenance Due   Topic Date Due     ANNUAL REVIEW OF HM ORDERS  Never done     ZOSTER IMMUNIZATION (1 of 2) Never done     Pneumococcal Vaccine: 65+ Years (2 of 2 - PPSV23) 2016     INFLUENZA VACCINE (1) Never done     FALL RISK ASSESSMENT  02/15/2022     MEDICARE ANNUAL WELLNESS VISIT  02/15/2022     {Chronicprobdata (optional):547565}  {Decision Support (Optional):756167}        Review of Systems  {ROS COMP (Optional):014051}    OBJECTIVE:   There were no vitals taken for this visit. Estimated body mass index is 29 kg/m  as calculated from the following:    Height as of 21: 1.905 m (6' 3\").    Weight as of 21: 105.2 kg (232 lb).  Physical Exam  {Exam (Optional) :225432}    {Diagnostic Test Results (Optional):353431::\"Diagnostic Test Results:\",\"Labs reviewed in Epic\"}    ASSESSMENT / PLAN:   {Diag Picklist:132956}    {Patient advised of split billing (Optional):009200}    COUNSELING:  {Medicare Counselin}    Estimated body mass " "index is 29 kg/m  as calculated from the following:    Height as of 8/13/21: 1.905 m (6' 3\").    Weight as of 8/13/21: 105.2 kg (232 lb).    {Weight Management Plan (ACO) Complete if BMI is abnormal-  Ages 18-64  BMI >24.9.  Age 65+ with BMI <23 or >30 (Optional):720689}    He reports that he has never smoked. He has never used smokeless tobacco.      Appropriate preventive services were discussed with this patient, including applicable screening as appropriate for cardiovascular disease, diabetes, osteopenia/osteoporosis, and glaucoma.  As appropriate for age/gender, discussed screening for colorectal cancer, prostate cancer, breast cancer, and cervical cancer. Checklist reviewing preventive services available has been given to the patient.    Reviewed patients plan of care and provided an AVS. The {CarePlan:160294} for Emiliano meets the Care Plan requirement. This Care Plan has been established and reviewed with the {PATIENT, FAMILY MEMBER, CAREGIVER:935438}.    Counseling Resources:  ATP IV Guidelines  Pooled Cohorts Equation Calculator  Breast Cancer Risk Calculator  Breast Cancer: Medication to Reduce Risk  FRAX Risk Assessment  ICSI Preventive Guidelines  Dietary Guidelines for Americans, 2010  [employer]'s MyPlate  ASA Prophylaxis  Lung CA Screening    Casey Ann MD  [facility]    Identified Health Risks:  " Admission

## 2022-02-17 NOTE — LETTER
February 18, 2022      Emiliano BEATRIZ Talley Jr.  [address]  [place] [address]        Dear ,    We are writing to inform you of your test results.    All very good and no sign of prostate cancer recurrence.    Resulted Orders   CBC with platelets   Result Value Ref Range    WBC Count 8.7 4.0 - 11.0 10e3/uL    RBC Count 4.59 4.40 - 5.90 10e6/uL    Hemoglobin 14.9 13.3 - 17.7 g/dL    Hematocrit 44.3 40.0 - 53.0 %    MCV 97 78 - 100 fL    MCH 32.5 26.5 - 33.0 pg    MCHC 33.6 31.5 - 36.5 g/dL    RDW 11.6 10.0 - 15.0 %    Platelet Count 188 150 - 450 10e3/uL   Comprehensive metabolic panel   Result Value Ref Range    Sodium 139 136 - 145 mmol/L    Potassium 4.6 3.5 - 5.0 mmol/L    Chloride 105 98 - 107 mmol/L    Carbon Dioxide (CO2) 25 22 - 31 mmol/L    Anion Gap 9 5 - 18 mmol/L    Urea Nitrogen 22 8 - 28 mg/dL    Creatinine 1.15 0.70 - 1.30 mg/dL    Calcium 9.1 8.5 - 10.5 mg/dL    Glucose 88 70 - 125 mg/dL    Alkaline Phosphatase 60 45 - 120 U/L    AST 28 0 - 40 U/L    ALT 27 0 - 45 U/L    Protein Total 7.0 6.0 - 8.0 g/dL    Albumin 4.1 3.5 - 5.0 g/dL    Bilirubin Total 1.3 (H) 0.0 - 1.0 mg/dL    GFR Estimate 64 >60 mL/min/1.73m2      Comment:      Effective December 21, 2021 eGFRcr in adults is calculated using the 2021 CKD-EPI creatinine equation which includes age and gender (Terrance et al., NEJ, DOI: 10.1056/TPJJht1478719)   TSH   Result Value Ref Range    TSH 2.23 0.30 - 5.00 uIU/mL   Prostate Specific Antigen Screen   Result Value Ref Range    Prostate Specific Antigen Screen <0.10 0.00 - 6.50 ug/L    Narrative    Assay Method is Abbott Prostate-Specific Antigen (PSA)  Standard-WHO 1st International (90:10)   Lipid panel reflex to direct LDL Fasting   Result Value Ref Range    Cholesterol 148 <=199 mg/dL    Triglycerides 62 <=149 mg/dL    Direct Measure HDL 52 >=40 mg/dL      Comment:      HDL Cholesterol Reference Range:     0-2 years:   No reference ranges established for patients under 2 years old  at  [facility] for lipid analytes.    2-8 years:  Greater than 45 mg/dL     18 years and older:   Female: Greater than or equal to 50 mg/dL   Male:   Greater than or equal to 40 mg/dL    LDL Cholesterol Calculated 84 <=129 mg/dL    Patient Fasting > 8hrs? Unknown        If you have any questions or concerns, please call the clinic at the number listed above.       Sincerely,      Casey Ann MD

## 2022-02-17 NOTE — PATIENT INSTRUCTIONS
Patient Education   Personalized Prevention Plan  You are due for the preventive services outlined below.  Your care team is available to assist you in scheduling these services.  If you have already completed any of these items, please share that information with your care team to update in your medical record.  Health Maintenance Due   Topic Date Due     Kidney Microalbumin Urine Test  Never done     ANNUAL REVIEW OF HM ORDERS  Never done     Urine Test  Never done     Zoster (Shingles) Vaccine (1 of 2) Never done     Pneumococcal Vaccine (2 of 2 - PPSV23) 11/09/2016     Flu Vaccine (1) Never done     Basic Metabolic Panel  02/15/2022     Cholesterol Lab  02/15/2022     FALL RISK ASSESSMENT  02/15/2022     Hemoglobin  02/15/2022

## 2022-02-17 NOTE — PROGRESS NOTES
Annual Wellness Visit:  Emiliano Talley Jr.  is a 81 year old male  who presents for an annual wellness visit.  Annual wellness visit history of left bundle branch block with negative cardiac work-up previously 1 daughter is a cardiologist at the [facility].  There has been no progression and today's EKG still showing a sinus mechanism rate 60 with a left bundle branch block and no acute changes.    Assessment/Plan:  Annual wellness visit and screen for prostate cancer and physical examination.  Past health history of left bundle branch block for this still practicing  [employer].  Today check hemogram comprehensive metabolic profile urinalysis lipid panel PSA TSH and EKG.  Prior history of left bundle branch block.    Colonoscopy dated August 6, 2020 was normal internal hemorrhoids.  Prior history of Blevins's esophagus does not use alcohol to excess non-smoker.  No weight loss no problems swallowing no anorexia.  The patient's last upper GI endoscopy done by Dr. Ronald Banks of [facility] on the [facility] was nonprogressive showing no change in the Blevins's esophagus no increased risk for dysplasia or cancer of the esophagus recheck was advised by Dr. Banks as 3 years.    The patient is well educated he has his undergraduate degree and PhD in philosophy from [employer].  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  Still practicing [employer].    Non-smoker no excess alcohol.    Subjective:   Medical History:  No past medical history on file.  Current Outpatient Medications   Medication     omeprazole (PRILOSEC) 40 MG DR capsule     rosuvastatin (CRESTOR) 5 MG tablet     fluocinolone acetonide (DERMA SMOOTHE/FS BODY) 0.01 % external oil     No current facility-administered medications for this visit.     Immunization History   Administered Date(s) Administered     DT (PEDS <7y) 01/02/2004     Pneumo Conj 13-V (2010&after) 11/09/2015     Pneumococcal 23 valent  "2004     Td,adult,historic,unspecified 2004, 2013     Tdap (Adacel,Boostrix) 2013       Surgical History:  No past surgical history on file.     Family History:  Mother  after hip fracture 89 she had a fall.    Mother  prostate cancer at 84.  7 children 21 grandchildren.  All the children are well including 1 daughter at the [facility] practicing cardiology wife well.  All of his children have technical degrees.    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  Exercises 3-4 times per week without any symptoms of chest pain shortness of breath syncope or palpitations.    Health Maintenances:  Immunizations are reviewed and up-to-date he is up-to-date with the Covid vaccine and he has had a colonoscopy in  which showed no sign of cancer.  He was advised to assume an every 3-year follow-up with EGD regarding history of Blevins's esophagus there is been no progression no dysplasia no esophageal cancer.    Objective:  /84   Pulse 60   Ht 1.905 m (6' 3\")   Wt 107 kg (236 lb)   SpO2 97%   BMI 29.50 kg/m    Chest clear to auscultation and percussion.  Heart tones regular rhythm without murmur rub or gallop.  Abdomen soft nontender no organomegaly.  No peritoneal signs.  Extremities free of edema cyanosis or clubbing.  Neck veins nondistended no thyromegaly or scleral icterus noted, carotids full.  Skin warm and dry easily conversant good spirited.  Normal intelligence.  Neurologically intact no gross localizing findings.  Rest of examination was negative in its entirety is easily conversant good spirited HEENT negative back straight no severe spine tenderness genital rectal exam negative good pulses in all 4 extremities there is some edema noted lower extremities it appears to be dependent edema no neck vein distention lungs are clear chest heart abdomen allCLEAR neck supple no thyromegaly no carotid bruits no lymphadenopathy appreciated lymph bearing " "areas.  Highly intelligent conversant soft-spoken.  Not in acute distress or toxic not acutely or chronically ill-appearing neuromuscular tone is good he is slightly stooped in posture.    Casey Ann MD, MD  Answers for HPI/ROS submitted by the patient on 2/17/2022  In general, how would you rate your overall physical health?: good  Frequency of exercise:: 2-3 days/week  Do you usually eat at least 4 servings of fruit and vegetables a day, include whole grains & fiber, and avoid regularly eating high fat or \"junk\" foods? : Yes  Taking medications regularly:: Yes  Medication side effects:: None  Activities of Daily Living: no assistance needed  Home safety: no safety concerns identified  Hearing Impairment:: no hearing concerns  In the past 6 months, have you been bothered by leaking of urine?: No  In general, how would you rate your overall mental or emotional health?: excellent  Additional concerns today:: No  Duration of exercise:: 15-30 minutes      "

## 2022-05-10 ENCOUNTER — TRANSFERRED RECORDS (OUTPATIENT)
Dept: HEALTH INFORMATION MANAGEMENT | Facility: CLINIC | Age: 82
End: 2022-05-10
Payer: MEDICARE

## 2022-05-13 DIAGNOSIS — E78.00 HYPERCHOLESTEREMIA: Primary | ICD-10-CM

## 2022-05-15 RX ORDER — ROSUVASTATIN CALCIUM 5 MG/1
TABLET, COATED ORAL
Qty: 90 TABLET | Refills: 3 | Status: SHIPPED | OUTPATIENT
Start: 2022-05-15 | End: 2023-01-08

## 2022-05-15 NOTE — TELEPHONE ENCOUNTER
"Last Written Prescription Date:  4/15/2021  Last Fill Quantity: 90,  # refills: 3   Last office visit provider:  2/17/2022 with PCP Dr RADHA Ann     Requested Prescriptions   Pending Prescriptions Disp Refills     rosuvastatin (CRESTOR) 5 MG tablet [Pharmacy Med Name: ROSUVASTATIN 5MG TABLETS] 90 tablet      Sig: TAKE 1 TABLET BY MOUTH EVERY DAY       Statins Protocol Passed - 5/13/2022 11:06 AM        Passed - LDL on file in past 12 months     Recent Labs   Lab Test 02/17/22  1447   LDL 84             Passed - No abnormal creatine kinase in past 12 months     No lab results found.             Passed - Recent (12 mo) or future (30 days) visit within the authorizing provider's specialty     Patient has had an office visit with the authorizing provider or a provider within the authorizing providers department within the previous 12 mos or has a future within next 30 days. See \"Patient Info\" tab in inbasket, or \"Choose Columns\" in Meds & Orders section of the refill encounter.              Passed - Medication is active on med list        Passed - Patient is age 18 or older             Diamond Andersen RN 05/15/22 5:02 PM  "

## 2022-06-17 ENCOUNTER — TRANSFERRED RECORDS (OUTPATIENT)
Dept: HEALTH INFORMATION MANAGEMENT | Facility: CLINIC | Age: 82
End: 2022-06-17

## 2022-08-03 ENCOUNTER — TRANSFERRED RECORDS (OUTPATIENT)
Dept: HEALTH INFORMATION MANAGEMENT | Facility: CLINIC | Age: 82
End: 2022-08-03

## 2022-10-24 ENCOUNTER — APPOINTMENT (OUTPATIENT)
Dept: CT IMAGING | Facility: CLINIC | Age: 82
End: 2022-10-24
Attending: EMERGENCY MEDICINE
Payer: MEDICARE

## 2022-10-24 ENCOUNTER — HOSPITAL ENCOUNTER (EMERGENCY)
Facility: CLINIC | Age: 82
Discharge: HOME | End: 2022-10-24
Attending: EMERGENCY MEDICINE | Admitting: EMERGENCY MEDICINE
Payer: MEDICARE

## 2022-10-24 ENCOUNTER — APPOINTMENT (OUTPATIENT)
Dept: RADIOLOGY | Facility: CLINIC | Age: 82
End: 2022-10-24
Attending: EMERGENCY MEDICINE
Payer: MEDICARE

## 2022-10-24 VITALS
HEART RATE: 64 BPM | SYSTOLIC BLOOD PRESSURE: 164 MMHG | BODY MASS INDEX: 29.84 KG/M2 | TEMPERATURE: 97.7 F | OXYGEN SATURATION: 96 % | DIASTOLIC BLOOD PRESSURE: 84 MMHG | WEIGHT: 240 LBS | HEIGHT: 75 IN | RESPIRATION RATE: 16 BRPM

## 2022-10-24 DIAGNOSIS — S60.413A ABRASION OF LEFT MIDDLE FINGER, INITIAL ENCOUNTER: ICD-10-CM
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
## 2022-10-24 DIAGNOSIS — S01.01XA SCALP LACERATION, INITIAL ENCOUNTER: ICD-10-CM
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
## 2022-10-24 PROBLEM — K57.30 DIVERTICULAR DISEASE OF LARGE INTESTINE: Status: ACTIVE | Noted: 2019-07-25

## 2022-10-24 PROBLEM — K44.9 DIAPHRAGMATIC HERNIA: Status: ACTIVE | Noted: 2019-11-27

## 2022-10-24 PROBLEM — R19.5 ABNORMAL FECES: Status: ACTIVE | Noted: 2019-11-11

## 2022-10-24 PROBLEM — K21.9 GERD (GASTROESOPHAGEAL REFLUX DISEASE): Status: ACTIVE | Noted: 2022-10-24

## 2022-10-24 PROBLEM — K59.00 CONSTIPATION: Status: ACTIVE | Noted: 2022-10-24

## 2022-10-24 PROBLEM — K64.9 HEMORRHOIDS: Status: ACTIVE | Noted: 2019-07-25

## 2022-10-24 PROBLEM — E78.5 HYPERLIPIDEMIA: Status: ACTIVE | Noted: 2022-10-24

## 2022-10-24 PROBLEM — R19.5 DARK STOOLS: Status: ACTIVE | Noted: 2022-10-24

## 2022-10-24 PROBLEM — K63.5 POLYP OF COLON: Status: ACTIVE | Noted: 2019-07-25

## 2022-10-24 PROCEDURE — 73140 X-RAY EXAM OF FINGER(S): CPT | Mod: LT

## 2022-10-24 PROCEDURE — 250N000013 HC RX MED GY IP 250 OP 250 PS 637: Performed by: EMERGENCY MEDICINE

## 2022-10-24 PROCEDURE — G1010 CDSM STANSON: HCPCS

## 2022-10-24 PROCEDURE — 12004 RPR S/N/AX/GEN/TRK7.6-12.5CM: CPT

## 2022-10-24 PROCEDURE — 99284 EMERGENCY DEPT VISIT MOD MDM: CPT | Mod: 25

## 2022-10-24 PROCEDURE — 250N000009 HC RX 250: Performed by: EMERGENCY MEDICINE

## 2022-10-24 RX ORDER — CLINDAMYCIN HCL 150 MG
450 CAPSULE ORAL ONCE
Status: COMPLETED | OUTPATIENT
Start: 2022-10-24 | End: 2022-10-24

## 2022-10-24 RX ORDER — GINSENG 100 MG
CAPSULE ORAL ONCE
Status: COMPLETED | OUTPATIENT
Start: 2022-10-24 | End: 2022-10-24

## 2022-10-24 RX ORDER — ACETAMINOPHEN 325 MG/1
975 TABLET ORAL ONCE
Status: COMPLETED | OUTPATIENT
Start: 2022-10-24 | End: 2022-10-24

## 2022-10-24 RX ORDER — IBUPROFEN 400 MG/1
400 TABLET, FILM COATED ORAL ONCE
Status: COMPLETED | OUTPATIENT
Start: 2022-10-24 | End: 2022-10-24

## 2022-10-24 RX ORDER — CLINDAMYCIN HCL 150 MG
450 CAPSULE ORAL 3 TIMES DAILY
Qty: 90 CAPSULE | Refills: 0 | Status: SHIPPED | OUTPATIENT
Start: 2022-10-24 | End: 2022-11-03

## 2022-10-24 RX ADMIN — CLINDAMYCIN HYDROCHLORIDE 450 MG: 150 CAPSULE ORAL at 12:40

## 2022-10-24 RX ADMIN — Medication: at 12:46

## 2022-10-24 RX ADMIN — IBUPROFEN 400 MG: 400 TABLET ORAL at 12:45

## 2022-10-24 RX ADMIN — ACETAMINOPHEN 975 MG: 325 TABLET, FILM COATED ORAL at 12:45

## 2022-10-24 ASSESSMENT — ACTIVITIES OF DAILY LIVING (ADL)
ADLS_ACUITY_SCORE: 35
ADLS_ACUITY_SCORE: 33

## 2022-10-24 ASSESSMENT — ENCOUNTER SYMPTOMS
COUGH: 0
FEVER: 0
LIGHT-HEADEDNESS: 0
SEIZURES: 0
SHORTNESS OF BREATH: 0
NAUSEA: 0
CHILLS: 0
WOUND: 1
DIZZINESS: 0
PALPITATIONS: 0
VOMITING: 0

## 2022-10-24 NOTE — DISCHARGE INSTRUCTIONS
Running water over the laceration is ok but do not soak the wound until the staples are removed; soaking could prematurely loosen the staples.    Take the antibiotic (clindamycin) to help prevent any worsening infection.    Use over-the-counter ointment (like Bacitracin or Neosporin) to the laceration to help prevent infection. Vaseline is also just as good at preventing infection and often much cheaper.    Get the staples removed in 7-14 days in primary care clinic.    Follow up with your Primary Care provider in 2 days for a recheck.    Return for any pus draining, streaking skin redness, persistent vomiting, inability to walk, or any other concerns.

## 2022-10-24 NOTE — ED TRIAGE NOTES
Pt here with a laceration to the top of his head after he fell last night while taking the garbage can out to the curb. No thinners. Denies LOC.      Triage Assessment     Row Name 10/24/22 2003       Triage Assessment (Adult)    Airway WDL WDL       Respiratory WDL    Respiratory WDL WDL       Skin Circulation/Temperature WDL    Skin Circulation/Temperature WDL WDL       Cardiac WDL    Cardiac WDL WDL       Peripheral/Neurovascular WDL    Peripheral Neurovascular WDL WDL       Cognitive/Neuro/Behavioral WDL    Cognitive/Neuro/Behavioral WDL WDL

## 2022-10-24 NOTE — ED PROVIDER NOTES
EMERGENCY DEPARTMENT ENCOUNTER      NAME: Emiliano Talley Jr.  AGE: 82 year old male  YOB: 1940  MRN: [id number]  EVALUATION DATE & TIME: 10/24/2022 10:54 AM    PCP: Casey Ann    ED PROVIDER: Sung Fiore M.D.      Chief Complaint   Patient presents with     Head Laceration     Fall         IMPRESSION  1. Fall from ground level    2. Fall from slip, trip, or stumble, initial encounter    3. Scalp laceration, initial encounter    4. Abrasion of left middle finger, initial encounter        PLAN  - clindamycin 450mg q8h x10 days  - routine non-absorbable sutured wound cares  - staple removal in 7-14 days in PCP clinic (10 total staples)  - discharge to home      ED COURSE & MEDICAL DECISION MAKING    82yoM not on blood thinners presenting about 16 hours after trip and fall with scalp laceration at home. Abrasion to left middle finger with negative X-rays; no concern for serious injury here. CT head with no ICH or skull fracture. Laceration cleansed copiously given delayed timing but certainly warrants closure; does have mild erythema surrounding incision already so given prophylactic clindamycin here now and will continue at home. Laceration closed with 10 staples without difficulty. Patient able to tolerate PO and walk without difficulty. Patient comfortable with discharge at this time. Return precautions and need for PCP follow up discussed and understood. No further questions at the time of discharge.    --------------------------------------------------------------------------------   --------------------------------------------------------------------------------     10:48 AM I met with the patient for the initial interview and physical examination. Discussed plan for treatment and workup in the ED.  12:28 PM I performed laceration repair procedure.      This patient involved a high degree of complexity in medical decision making, as significant risks were present and assessed.    Broad  differential considered for this patient presenting, including but not limited to:  Scalp laceration, cellulitis, ICH, skull fracture, mechanical fall, other etiology to fall, finger contusion, finger fracture, neurovascular injury, tendo injury    I wore the following PPE during this patient encounter:  N95 mask, face shield w/ eye protection, gloves      MEDICATIONS GIVEN IN THE EMERGENCY DEPARTMENT  Medications   bacitracin ointment ( Topical Given 10/24/22 1246)   clindamycin (CLEOCIN) capsule 450 mg (450 mg Oral Given 10/24/22 1240)   ibuprofen (ADVIL/MOTRIN) tablet 400 mg (400 mg Oral Given 10/24/22 1245)   acetaminophen (TYLENOL) tablet 975 mg (975 mg Oral Given 10/24/22 1245)       NEW PRESCRIPTIONS STARTED AT TODAY'S ER VISIT  Discharge Medication List as of 10/24/2022 12:51 PM      CONTINUE these medications which have NOT CHANGED    Details   fluocinolone acetonide (DERMA SMOOTHE/FS BODY) 0.01 % external oil Historical      omeprazole (PRILOSEC) 40 MG DR capsule Take 40 mg by mouth daily, Historical      rosuvastatin (CRESTOR) 5 MG tablet TAKE 1 TABLET BY MOUTH EVERY DAY, Disp-90 tablet, R-3, E-Prescribe                 =================================================================      HPI  Patient information was obtained from: patient and his wife    Use of : N/A       Emiliano Talley Jr. is a 82 year old male with no pertinent history on file who presents to this ED by private car for evaluation of fall.    The patient reports he was taking his garbage can out last night at ~7:00 PM when he fell and struck his head on the handle of the garbage can. He attributes his fall to weakness, denies dizziness. He denies hitting his head on the pavement. He denies any LOC. He sustained a laceration to the left side of his head. He endorses pain in his head around the laceration. He also scraped his left 2nd finger and states his finger is sore. The patient denies being on blood thinners. Patient  denies additional medical concerns or complaints at this time.        REVIEW OF SYSTEMS   Review of Systems   Constitutional: Negative for chills and fever.   Respiratory: Negative for cough and shortness of breath.    Cardiovascular: Negative for chest pain and palpitations.   Gastrointestinal: Negative for nausea and vomiting.   Skin: Positive for wound (laceration to left side of head, abrasion to left middle finger).   Neurological: Negative for dizziness, seizures, syncope and light-headedness.   All other systems reviewed and are negative.    All other systems reviewed and are negative except as noted above in HPI.        --------------- MEDICAL HISTORY ---------------  PAST MEDICAL HISTORY:  History reviewed. No pertinent past medical history.  Patient Active Problem List   Diagnosis     Adenocarcinoma Of The Prostate Gland     Left Bundle Branch Block     Chronic kidney disease, stage 3a (H)     Abnormal feces     GERD (gastroesophageal reflux disease)     Constipation     Dark stools     Diaphragmatic hernia     Diverticular disease of large intestine     Hemorrhoids     Hyperlipidemia     Polyp of colon       PAST SURGICAL HISTORY:  History reviewed. No pertinent surgical history.    CURRENT MEDICATIONS:    No current facility-administered medications for this encounter.    Current Outpatient Medications:      clindamycin (CLEOCIN) 150 MG capsule, Take 3 capsules (450 mg) by mouth 3 times daily for 10 days, Disp: 90 capsule, Rfl: 0     fluocinolone acetonide (DERMA SMOOTHE/FS BODY) 0.01 % external oil, , Disp: , Rfl:      omeprazole (PRILOSEC) 40 MG DR capsule, Take 40 mg by mouth daily, Disp: , Rfl:      rosuvastatin (CRESTOR) 5 MG tablet, TAKE 1 TABLET BY MOUTH EVERY DAY, Disp: 90 tablet, Rfl: 3    ALLERGIES:  No Known Allergies    FAMILY HISTORY:  Family History   Problem Relation Age of Onset     Cancer Father        SOCIAL HISTORY:   Social History     Socioeconomic History     Marital status:   "  Tobacco Use     Smoking status: Never     Smokeless tobacco: Never   Substance and Sexual Activity     Alcohol use: No     Drug use: No       --------------- PHYSICAL EXAM ---------------  Nursing notes and vitals reviewed by me.  VITALS:  Vitals:    10/24/22 1051 10/24/22 1301   BP: 134/83 (!) 164/84   Pulse: 71 64   Resp: 18 16   Temp:  97.7  F (36.5  C)   TempSrc:  Oral   SpO2: 95% 96%   Weight: 108.9 kg (240 lb)    Height: 1.905 m (6' 3\")        PHYSICAL EXAM:    General:  alert, interactive, no distress  Eyes:  conjunctivae clear, conjugate gaze  HENT:  nose with no rhinorrhea, oropharynx clear.  - 8cm laceration to left frontal scalp with subQ tissue exposed, no active bleeding, mild erythema to skin surrounding laceration  - midface stable and nontender with no evidence of trauma here  Neck:  no meningismus. No c-spine tenderness. Painless ROM intact.  Cardiovascular:  HR 70s during exam, regular rhythm, no murmurs, brisk cap refill  Chest:  no chest wall tenderness  Pulmonary:  no stridor, normal phonation, normal work of breathing, clear lungs bilaterally  Abdomen:  soft, nondistended, nontender  :  no CVA tenderness  Back:  no midline spinal tenderness  Musculoskeletal:    RUE: atraumatic with painless active ROM intact & distal CMS intact  LUE:  Small nontender abrasion over 3rd finger PIP extensor aspect with full ROM intact, distal CMS intact; extremity otherwise atraumatic with painless active ROM intact & distal CMS intact  BLE:  atraumatic with painless active ROM intact & distal CMS intact  Skin:  warm, dry, no rash  Neuro:  awake, alert, answers questions appropriately, follows commands, moves all limbs, CN 2-12 intact, negative pronator drift, 5/5 strength to all extremities with sensation to light touch intact, no tremor  Psych:  calm, normal affect      --------------- RESULTS ---------------  RADIOLOGY:  Reviewed by me. Please see official radiology report.  Recent Results (from the past 24 " hour(s))   CT Head w/o Contrast    Narrative    EXAM: CT HEAD W/O CONTRAST  LOCATION: [facility]  DATE/TIME: 10/24/2022 11:20 AM    INDICATION: fall, head injury. Scalp laceration.[id number]  COMPARISON: 11/06/2020.  TECHNIQUE: Routine CT Head without IV contrast. Multiplanar reformats. Dose reduction techniques were used.    FINDINGS:  INTRACRANIAL CONTENTS: No intracranial hemorrhage, extraaxial collection, or mass effect.  No CT evidence of acute infarct. Normal parenchymal attenuation. No hydrocephalus. Cavum septa pellucidum et vergae. Mild diffuse cerebral parenchymal volume loss.    VISUALIZED ORBITS/SINUSES/MASTOIDS: Prior bilateral cataract surgery. Visualized portions of the orbits are otherwise unremarkable. No paranasal sinus mucosal disease. No middle ear or mastoid effusion.    BONES/SOFT TISSUES: Left frontal scalp contusion. No calvarial or skull base fracture.      Impression    IMPRESSION:  1.  No acute intracranial abnormality.  2.  No calvarial or skull base fracture.   Fingers XR, 2-3 views, left    Narrative    EXAM: XR FINGER LEFT G/E 2 VIEWS  LOCATION: [facility]  DATE/TIME: 10/24/2022 11:23 AM    INDICATION: fall, PIP abrasion pain  COMPARISON: None.      Impression    IMPRESSION: Mild IP joint degenerative change of the middle finger. No fracture.           PROCEDURES:   [facility]    -Laceration Repair    Date/Time: 10/24/2022 12:28 PM  Performed by: Sung Fiore MD  Authorized by: Sung Fiore MD     Risks, benefits and alternatives discussed.      UNIVERSAL PROTOCOL   Site Marked: NA  Prior Images Obtained and Reviewed:  Yes  Required items: Required blood products, implants, devices and special equipment available    Patient identity confirmed:  Verbally with patient  NA - No sedation, light sedation, or local anesthesia  Confirmation Checklist:  Patient's identity using two indicators and relevant  allergies  Universal Protocol: the Joint Commission Universal Protocol was followed      ANESTHESIA (see MAR for exact dosages):     Anesthesia method:  Local infiltration    Local anesthetic:  Lidocaine 1% WITH epi  LACERATION DETAILS     Location:  Scalp    Scalp location:  Frontal (left)    Length (cm):  8    Depth (mm):  3    REPAIR TYPE:     Repair type:  Intermediate      EXPLORATION:     Wound exploration: wound explored through full range of motion and entire depth of wound probed and visualized      Wound extent: areolar tissue violated      Wound extent: fascia not violated, no foreign body, no signs of injury, no nerve damage, no tendon damage, no underlying fracture and no vascular damage      Contaminated: no      TREATMENT:     Area cleansed with:  Mark    Amount of cleaning:  Extensive    Irrigation solution:  Sterile saline    Irrigation method:  Syringe    Visualized foreign bodies/material removed: no      SKIN REPAIR     Repair method:  Staples    Number of staples:  10    APPROXIMATION     Approximation:  Close    POST-PROCEDURE DETAILS     Dressing:  Antibiotic ointment and non-adherent dressing        PROCEDURE  Describe Procedure: Skin surrounding laceration edges already with erythema and suggestion of early cellulitis prior to closure. Risks/benefits to delayed closure like this discussed and understood by patient & his wife.  Patient Tolerance:  Patient tolerated the procedure well with no immediate complications         I, Calista Alvraado, am serving as a scribe to document services personally performed by Dr. Sung Fiore based on my observation and the provider's statements to me. I, Sung Fiore MD attest that Calista Alvarado is acting in a scribe capacity, has observed my performance of the services and has documented them in accordance with my direction.      Sung Fiore MD  10/24/22  Emergency Medicine  [facility] EMERGENCY ROOM  [address]  [facility] [id number]  [phone]  Dept: [phone]     Sung Fiore MD  10/24/22 8568

## 2022-11-01 ENCOUNTER — OFFICE VISIT (OUTPATIENT)
Dept: FAMILY MEDICINE | Facility: CLINIC | Age: 82
End: 2022-11-01
Payer: MEDICARE

## 2022-11-01 VITALS
HEART RATE: 67 BPM | SYSTOLIC BLOOD PRESSURE: 130 MMHG | RESPIRATION RATE: 17 BRPM | TEMPERATURE: 98 F | OXYGEN SATURATION: 99 % | WEIGHT: 232 LBS | BODY MASS INDEX: 28.85 KG/M2 | HEIGHT: 75 IN | DIASTOLIC BLOOD PRESSURE: 84 MMHG

## 2022-11-01 DIAGNOSIS — S01.01XD LACERATION OF SCALP WITHOUT FOREIGN BODY, SUBSEQUENT ENCOUNTER: ICD-10-CM

## 2022-11-01 DIAGNOSIS — S60.419D: ICD-10-CM

## 2022-11-01 DIAGNOSIS — S09.90XD CLOSED HEAD INJURY, SUBSEQUENT ENCOUNTER: Primary | ICD-10-CM

## 2022-11-01 PROCEDURE — 99215 OFFICE O/P EST HI 40 MIN: CPT | Performed by: FAMILY MEDICINE

## 2022-11-01 NOTE — PATIENT INSTRUCTIONS
Continue to keep area staples clean and dry    Continue bacitracin and a small Band-Aid over the areas on your fingers    Follow up on Friday arrive at 10:10 for wound check, staple removal       If increase swelling, drainage-pus from wound severe headache more than baseline to ER

## 2022-11-01 NOTE — PROGRESS NOTES
ASSESSMENT/PLAN:      ICD-10-CM    1. Closed head injury, subsequent encounter  S09.90XD       2. Laceration of scalp without foreign body, subsequent encounter  S01.01XD       3. Abrasion of finger of left hand, subsequent encounter  S60.419D         Reviewed medication instructions and side effects. Follow up if experiences side effects.     I reviewed supportive care, otc meds to use if needed, expected course, and signs of concern.  Follow up as needed or if he does not improve within  1-2 days or if worsens in any way.  Reviewed red flag symptoms and is to go to the ER if experiences any of these.     The use of Dragon/Avatar Realityation services may have been used to construct the content in this note; any grammatical or spelling errors are non-intentional. Please contact the author of this note directly if you are in need of any clarification.      On the day of the encounter, time spend on chart review, patient visit, review of testing, documentation was 45 minutes          Patient Instructions   Continue to keep area staples clean and dry    Continue bacitracin and a small Band-Aid over the areas on your fingers    Follow up on Friday arrive at 10:10 for wound check, staple removal       If increase swelling, drainage-pus from wound severe headache more than baseline to ER                        Patient presents with:  Hospital F/U: 10/24/22 ER follow up        Subjective     Emiliano HENDERSON Mayank Wolff is a 82 year old male who presents to clinic today for the following health issues:    HPI   Patient with history of fall and head laceration on 10/23/2022.  He is not on anticoagulation therapy.    He was pushing a heavy garbage container out of his garage to the curb. The edge of the front wheels of the garbage container hit a divot in the concrete of the entrance of his garage and flipped backwards and the patient fell  to the ground on his buttocks .he hit his head on the handle of the garbage container and as  he fell the top of the garbage container flipped and hit him on the top of his head just above his forehead.  He also scraped the top of his left second finger  He had no loss of consciousness. laceration across his scalp as noted. Cleaned laceration and bleeding from area resolved after injury.  He did not seek care at the ED until the following day, 10/24/2022-approximately 16 hours after the injury.    In the ED,  head CT completed which was negative.  In addition he had a vigorous irrigation of his scalp wound and 10 staples were placed.  He was discharged home on clindamycin prophylaxis due some surrounding erythema noted at the time of exam in the ED.     Since his evaluation on 10/24/2022 in the ED, area of laceration had some mild oozing and now has thick black eschar across the entire wound.   He has had a mild intermittent headache since the time of injury. He denies any dizziness, changes in vision, nausea or vomiting.  No fever.  No worsening pain of the laceration.  No changes in mental status.  Wife is with patient at present.  She did awaken patient on the day of injury and throughout the night and he awakened with ease. No changes in baseline mental status, no changes in his gait since that time.  He has reported as noted a mild headache daily since injury. Typically resolves with Tylenol.   Wife reported no increased erythema or swelling in the area of the laceration.  They have kept area clean and dry.  There has been no drainage or bleeding from the area since the formation of the black eschar across the wound.     Of note,  patient is a  and has been working daily.his office since the day after his ED evaluation and head laceration repair.    Today the patient noted a mild headache.  Denies any dizziness lightheadedness changes in vision.  Per wife no changes in his baseline mental status and no change in his gait.  Patient is a  and will be returning to work at his  "office after our visit today.    No past medical history on file.  Social History     Tobacco Use     Smoking status: Never     Smokeless tobacco: Never   Substance Use Topics     Alcohol use: No       Current Outpatient Medications   Medication Sig Dispense Refill     omeprazole (PRILOSEC) 40 MG DR capsule Take 40 mg by mouth daily       rosuvastatin (CRESTOR) 5 MG tablet TAKE 1 TABLET BY MOUTH EVERY DAY 90 tablet 3     No Known Allergies          ROS are negative, except as otherwise noted HPI      Objective    /84   Pulse 67   Temp 98  F (36.7  C) (Oral)   Resp 17   Ht 1.905 m (6' 3\")   Wt 105.2 kg (232 lb)   SpO2 99%   BMI 29.00 kg/m    Body mass index is 29 kg/m .  Physical Exam   GENERAL:  alert and no distress  EYES: Eyes grossly normal to inspection, PERRL and conjunctivae and sclerae normal  HENT: Normocephalic, 8 cm laceration across left frontal scalp covered by thick black eschar-unable to visualize more than 2 staples   ear canals and TM's normal, no hemotympanum,  nose and mouth without ulcers or lesions  NECK: no adenopathy, no asymmetry, masses,  RESP: lungs clear to auscultation - no rales, rhonchi or wheezes  CV: regular rate and rhythm, , no murmur, click or rub,  MS: no gross musculoskeletal defects noted,   Left hand-small abrasion over the PIP of second finger, no surrounding erythema, FROM of finger/no pain except for area if abrasion, no evidence of ligamentous injury or tear   NEURO: Normal strength and tone, mentation intact and speech normal  NEURO: sensory exam grossly normal, cranial nerves 2-12 intact, DTR's normal and symmetric at knees, gait normal including heel/toe/tandem walking and Romberg normal  PSYCH: mentation appears normal, affect normal/bright    Scalp laceration- thick black eschar was carefully debrided with moist sterile gauze and an area cleaned with sterile alcohol wipes by this provider  All 10 staples were intact wound borders were well approximated  " however not fully closed/not well-healed at this time  No surrounding erythema no drainage from wound    Left hand second finger-abrasion with early healing no erythema no drainage  Patient washed his hands and area abrasion with soap and water.  After drying his hands,  this provider applied a small amount of bacitracin followed by Band-Aid  to protect area on finger       Diagnostic Test Results:  Labs reviewed in Epic  No results found for any visits on 11/01/22.

## 2022-11-01 NOTE — PROGRESS NOTES
"    ASSESSMENT/PLAN:      ICD-10-CM    1. Chronic kidney disease, stage 3a (H)  N18.31                 Reviewed medication instructions and side effects. Follow up if experiences side effects.     I reviewed supportive care, otc meds to use if needed, expected course, and signs of concern.  Follow up as needed or if he does not improve within  1-2 days or if worsens in any way.  Reviewed red flag symptoms and is to go to the ER if experiences any of these.     The use of Dragon/PowerMic dictation services may have been used to construct the content in this note; any grammatical or spelling errors are non-intentional. Please contact the author of this note directly if you are in need of any clarification.      On the day of the encounter, time spend on chart review, patient visit, review of testing, documentation was *** minutes          There are no Patient Instructions on file for this visit.            Patient presents with:  Hospital F/U: 11/31/22       Subjective     Emiliano Talley Jr. is a 82 year old male who presents to clinic today for the following health issues:    HPI     Head Injury    Onset of symptoms was {NUMBERS 1-12:10} {TIME UNITS:9080} ago.  Mechanism of Injury: {UC Head Injury:357204}  Loss of consciousness: {NO/YES:000747}  Course of illness is {STATUS:049713}.    Severity {SEVERITY:175271::\"moderate\"}  Current and Associated symptoms: {UC Head Injury Sx:543104}  Treatment measures tried include: {UC Head Injury Tx:447547}  ***    Refer to [facility]      {additional problems for provider to add (Optional):341107}  {ACUTE SUPERLIST - extended history:572903}    No past medical history on file.  Social History     Tobacco Use     Smoking status: Never     Smokeless tobacco: Never   Substance Use Topics     Alcohol use: No       Current Outpatient Medications   Medication Sig Dispense Refill     clindamycin (CLEOCIN) 150 MG capsule Take 3 capsules (450 mg) by mouth 3 times daily for 10 days " "90 capsule 0     fluocinolone acetonide (DERMA SMOOTHE/FS BODY) 0.01 % external oil        omeprazole (PRILOSEC) 40 MG DR capsule Take 40 mg by mouth daily       rosuvastatin (CRESTOR) 5 MG tablet TAKE 1 TABLET BY MOUTH EVERY DAY 90 tablet 3     No Known Allergies          ROS are negative, except as otherwise noted HPI      Objective    /84   Pulse 67   Temp 98  F (36.7  C) (Oral)   Resp 17   Ht 1.905 m (6' 3\")   Wt 105.2 kg (232 lb)   SpO2 99%   BMI 29.00 kg/m    Body mass index is 29 kg/m .  Physical Exam   {Exam List (Optional):387456}  {O PHRASES:617626}     {Diagnostic Test Results (Optional):616318::\"Diagnostic Test Results:\",\"Labs reviewed in Epic\"}                  "

## 2022-11-04 ENCOUNTER — OFFICE VISIT (OUTPATIENT)
Dept: FAMILY MEDICINE | Facility: CLINIC | Age: 82
End: 2022-11-04
Payer: MEDICARE

## 2022-11-04 VITALS
BODY MASS INDEX: 29.15 KG/M2 | DIASTOLIC BLOOD PRESSURE: 80 MMHG | TEMPERATURE: 97.7 F | WEIGHT: 233.2 LBS | SYSTOLIC BLOOD PRESSURE: 126 MMHG | HEART RATE: 88 BPM

## 2022-11-04 DIAGNOSIS — S01.01XD LACERATION OF SCALP, SUBSEQUENT ENCOUNTER: Primary | ICD-10-CM

## 2022-11-04 DIAGNOSIS — S09.90XD CLOSED HEAD INJURY, SUBSEQUENT ENCOUNTER: ICD-10-CM

## 2022-11-04 DIAGNOSIS — S60.419D: ICD-10-CM

## 2022-11-04 DIAGNOSIS — Z48.02 ENCOUNTER FOR STAPLE REMOVAL: ICD-10-CM

## 2022-11-04 PROCEDURE — 99212 OFFICE O/P EST SF 10 MIN: CPT | Performed by: FAMILY MEDICINE

## 2022-11-04 RX ORDER — ACETAMINOPHEN 325 MG/1
650 TABLET ORAL ONCE
Status: DISCONTINUED | OUTPATIENT
Start: 2022-11-04 | End: 2024-01-06

## 2022-11-04 NOTE — PATIENT INSTRUCTIONS
Schedule appointment to have your flu shot done at the clinic    Schedule appointment to have your pneumonia shot the Prevnar 20 at your pharmacy    Several weeks later scheduled to have your shingles vaccine at your pharmacy

## 2022-11-04 NOTE — PROGRESS NOTES
ASSESSMENT/PLAN:      ICD-10-CM    1. Laceration of scalp, subsequent encounter  S01.01XD acetaminophen (TYLENOL) tablet 650 mg     REMOVAL OF SUTURES      2. Closed head injury, subsequent encounter  S09.90XD       3. Abrasion of finger, left, subsequent encounter  S60.419D       4. Encounter for staple removal  Z48.02 REMOVAL OF SUTURES        Reviewed medication/steristrip  instructions and side effects. Follow up if experiences side effects.   I reviewed supportive care, otc meds to use if needed, expected course, and signs of concern.  \  Follow up as needed or if does not improve within 1-2 day(s) or if worsens in any way.    Reviewed red flag symptoms and is to go to the ER if experiences any of these.      On the day of the encounter, time spend on chart review, patient visit, review of testing, documentation NOT including time for suture removal-20 minutes          Patient Instructions     Schedule appointment to have your flu shot done at the clinic    Schedule appointment to have your pneumonia shot the Prevnar 20 at your pharmacy    Several weeks later scheduled to have your shingles vaccine at your pharmacy            Mick Cummings is a 82 year old accompanied by his spouse, presenting for the following health issues:  Wound Check      Wound Check       Emiliano Talley . 82 year old male, for wound check, evaluation for staple removal  Patient with history of fall and head laceration on 10/23/2022.  No LOC.  He is not on anticoagulation therapy. Laceration repaired 11/24/22- 16+ hrs after injury in ED with 10 staples. Treated with oral clindamycin-last dose yesterday, 11/3/22,  due to erythema noted on exam at time of evaluation in ER.  Head laceration due to large garbage filled container patient was taking to the curb, flipped back on patient, hit head, top of container flipped back hit him in the head/ lacerated scalp.  In addition abrasion of left hand, over dorsum of PIP, small amount of  erythema, Using bacitracin on area,  keeping wound covered,   Patient seen in clinic on 11/1/22 for wound check, eschar from blooding oozing post repair debrided from area surrounding wound.     Since last evaluation 11/1/22  No oozing from scalp  wound, no new eschar, no erythema , completed clindamycin yesterday, 11/3/22  Abrasion on left hand-dorsum of PIP on second finger-circled by family due to increase in redness on 11/2/22-since circled area, redness has decreased, never spread past borders of circled area  Closed head injury-CT scan in ER negative, mild headache noted at 11/1/22 appointment.  Last headache yesterday, no headache today       Procedure     Today presents to the clinic today for removal of sutures and staples.  The patient has had the sutures and staples in place for 11 days.  There has been no history of infection or drainage.  10 sutures are seen located on the 8 cm laceration of frontal scalp.  The wound is healing well, wound edges well approximated and fully closed with no signs of infection. Tetanus is up to date.  Nine of the 10 staples were easily removed. One staple -first staple placed, right edge of  wound-unable to remove with staple remover device, easily removed, intact with a splinter forceps, wound edges remained approximated.  Small amount of blood oozing from sites of staple removal, Area cleaned and steri strips placed with bezoin across the entire wound in usual fashion. .Routine wound care discussed.  The patient will follow up as needed.    Review of Systems   Constitutional, HEENT, cardiovascular, pulmonary, GI, , musculoskeletal, neuro, skin, endocrine and psych systems are negative, except as otherwise noted.      Objective    /80   Pulse 88   Temp 97.7  F (36.5  C) (Oral)   Wt 105.8 kg (233 lb 3.2 oz)   BMI 29.15 kg/m    Body mass index is 29.15 kg/m .  Physical Exam   GENERAL: healthy, alert and no distress  SKIN: 8 cm scalp laceration, 10 staples intact,  wound well approximated and fully closed, no erythema, minimal eschar, no drainage  Left hand second finger-dorsum of pip abrasion, nearly resolved, mild erythema, non tender FROM of PIP without pain, area circled, erythema has nearly resolved, did not pass borders of circled area   NEURO: Normal strength and tone, mentation intact and speech normal, normal gait       Diagnostic Test Results:  Labs reviewed in Epic  No results found for any visits on 11/04/22.

## 2022-11-25 ENCOUNTER — NURSE TRIAGE (OUTPATIENT)
Dept: NURSING | Facility: CLINIC | Age: 82
End: 2022-11-25

## 2022-11-25 NOTE — TELEPHONE ENCOUNTER
Nurse Triage SBAR    Is this a 2nd Level Triage? No    Situation: Patient is calling that he needs a history and physical within 30 days of endoscopy.  Patient states H & P should be faxed to: [phone]. Patient also needs to schedule an annual visit.      Background: Patient states he has an endoscopy scheduled on 12/22/22.     Recommendation: Per disposition, Information provided, warm transferred the patient to scheduling.  Patient verbalized understanding and agrees with plan.    Protocol Recommended Disposition: Telephone advice    Delores Truong RN on 11/25/2022 at 1:56 PM  [facility] Nurse Advisors    Reason for Disposition    Question about upcoming scheduled test, no triage required and triager able to answer question    Protocols used: INFORMATION ONLY CALL - NO TRIAGE-A-

## 2022-12-02 ENCOUNTER — OFFICE VISIT (OUTPATIENT)
Dept: FAMILY MEDICINE | Facility: CLINIC | Age: 82
End: 2022-12-02
Payer: MEDICARE

## 2022-12-02 VITALS
SYSTOLIC BLOOD PRESSURE: 128 MMHG | OXYGEN SATURATION: 97 % | WEIGHT: 235.6 LBS | DIASTOLIC BLOOD PRESSURE: 80 MMHG | TEMPERATURE: 98 F | HEART RATE: 67 BPM | RESPIRATION RATE: 20 BRPM | HEIGHT: 75 IN | BODY MASS INDEX: 29.29 KG/M2

## 2022-12-02 DIAGNOSIS — E07.89 THYROID FULLNESS: ICD-10-CM

## 2022-12-02 DIAGNOSIS — N18.31 CHRONIC KIDNEY DISEASE, STAGE 3A (H): ICD-10-CM

## 2022-12-02 DIAGNOSIS — Z00.00 ROUTINE GENERAL MEDICAL EXAMINATION AT A HEALTH CARE FACILITY: ICD-10-CM

## 2022-12-02 DIAGNOSIS — Z01.818 PREOP GENERAL PHYSICAL EXAM: Primary | ICD-10-CM

## 2022-12-02 PROBLEM — K22.70 BARRETT'S ESOPHAGUS: Status: ACTIVE | Noted: 2019-12-02

## 2022-12-02 LAB
ALBUMIN UR-MCNC: NEGATIVE MG/DL
ANION GAP SERPL CALCULATED.3IONS-SCNC: 11 MMOL/L (ref 7–15)
APPEARANCE UR: CLEAR
BILIRUB UR QL STRIP: NEGATIVE
BUN SERPL-MCNC: 27 MG/DL (ref 8–23)
CALCIUM SERPL-MCNC: 9 MG/DL (ref 8.8–10.2)
CHLORIDE SERPL-SCNC: 106 MMOL/L (ref 98–107)
COLOR UR AUTO: YELLOW
CREAT SERPL-MCNC: 1.24 MG/DL (ref 0.67–1.17)
DEPRECATED HCO3 PLAS-SCNC: 26 MMOL/L (ref 22–29)
ERYTHROCYTE [DISTWIDTH] IN BLOOD BY AUTOMATED COUNT: 11.7 % (ref 10–15)
GFR SERPL CREATININE-BSD FRML MDRD: 58 ML/MIN/1.73M2
GLUCOSE SERPL-MCNC: 94 MG/DL (ref 70–99)
GLUCOSE UR STRIP-MCNC: NEGATIVE MG/DL
HCT VFR BLD AUTO: 44 % (ref 40–53)
HGB BLD-MCNC: 14.7 G/DL (ref 13.3–17.7)
HGB UR QL STRIP: NEGATIVE
KETONES UR STRIP-MCNC: NEGATIVE MG/DL
LEUKOCYTE ESTERASE UR QL STRIP: NEGATIVE
MCH RBC QN AUTO: 32.6 PG (ref 26.5–33)
MCHC RBC AUTO-ENTMCNC: 33.4 G/DL (ref 31.5–36.5)
MCV RBC AUTO: 98 FL (ref 78–100)
NITRATE UR QL: NEGATIVE
PH UR STRIP: 7 [PH] (ref 5–8)
PLATELET # BLD AUTO: 184 10E3/UL (ref 150–450)
POTASSIUM SERPL-SCNC: 4.6 MMOL/L (ref 3.4–5.3)
RBC # BLD AUTO: 4.51 10E6/UL (ref 4.4–5.9)
SODIUM SERPL-SCNC: 143 MMOL/L (ref 136–145)
SP GR UR STRIP: 1.02 (ref 1–1.03)
UROBILINOGEN UR STRIP-ACNC: 1 E.U./DL
WBC # BLD AUTO: 8 10E3/UL (ref 4–11)

## 2022-12-02 PROCEDURE — 36415 COLL VENOUS BLD VENIPUNCTURE: CPT | Performed by: STUDENT IN AN ORGANIZED HEALTH CARE EDUCATION/TRAINING PROGRAM

## 2022-12-02 PROCEDURE — 91312 COVID-19 VACCINE BIVALENT BOOSTER 12+ (PFIZER): CPT | Performed by: STUDENT IN AN ORGANIZED HEALTH CARE EDUCATION/TRAINING PROGRAM

## 2022-12-02 PROCEDURE — 85027 COMPLETE CBC AUTOMATED: CPT | Performed by: STUDENT IN AN ORGANIZED HEALTH CARE EDUCATION/TRAINING PROGRAM

## 2022-12-02 PROCEDURE — 0124A COVID-19 VACCINE BIVALENT BOOSTER 12+ (PFIZER): CPT | Performed by: STUDENT IN AN ORGANIZED HEALTH CARE EDUCATION/TRAINING PROGRAM

## 2022-12-02 PROCEDURE — 93010 ELECTROCARDIOGRAM REPORT: CPT | Mod: OFF | Performed by: INTERNAL MEDICINE

## 2022-12-02 PROCEDURE — 81003 URINALYSIS AUTO W/O SCOPE: CPT | Performed by: STUDENT IN AN ORGANIZED HEALTH CARE EDUCATION/TRAINING PROGRAM

## 2022-12-02 PROCEDURE — 80048 BASIC METABOLIC PNL TOTAL CA: CPT | Performed by: STUDENT IN AN ORGANIZED HEALTH CARE EDUCATION/TRAINING PROGRAM

## 2022-12-02 PROCEDURE — 93005 ELECTROCARDIOGRAM TRACING: CPT | Performed by: STUDENT IN AN ORGANIZED HEALTH CARE EDUCATION/TRAINING PROGRAM

## 2022-12-02 PROCEDURE — 99214 OFFICE O/P EST MOD 30 MIN: CPT | Mod: 25 | Performed by: STUDENT IN AN ORGANIZED HEALTH CARE EDUCATION/TRAINING PROGRAM

## 2022-12-02 NOTE — PROGRESS NOTES
[address] [address]  Phone: [phone]  Fax: [phone]  Primary Provider: Casey Ann  Pre-op Performing Provider: KOMAL MARTINEZ      PREOPERATIVE EVALUATION:  Today's date: 12/2/2022    Emiliano Talley Jr. is a 82 year old male who presents for a preoperative evaluation.    Surgical Information:  Surgery/Procedure: Endoscopy  Surgery Location: [facility]  Surgeon:   Surgery Date: 12/22/22  Time of Surgery: 10:00  Where patient plans to recover: At home with family  Fax number for surgical facility: Note does not need to be faxed, will be available electronically in Epic.    Type of Anesthesia Anticipated: General    Assessment & Plan     The proposed surgical procedure is considered INTERMEDIATE risk.      ICD-10-CM    1. Preop general physical exam  Z01.818 COVID-19,PF,PFIZER BOOSTER BIVALENT (12+YRS)     CBC with platelets     Basic metabolic panel  (Ca, Cl, CO2, Creat, Gluc, K, Na, BUN)     EKG 12-lead, tracing only      2. Chronic kidney disease, stage 3a (H)  N18.31       3. Thyroid fullness  E07.89 US Thyroid        Preop:  RCRI 0  Patient has chronic, stable LBBB  Repeat EKG showed stable LBBB   COVID test to be done at home  Will get CBC and BMP for anesthesia purposes  Is only on Prilosec rosuvastatin, okay to continue chronic meds    Thyroid fullness:  Patient had some right-sided thyroid fullness on exam today.  Unclear if this is a new finding or not as have never met the patient before.  We will pursue a thyroid ultrasound.  We do not need to get this thyroid ultrasound done prior to the EGD.      Risks and Recommendations:  The patient has the following additional risks and recommendations for perioperative complications:   - No identified additional risk factors other than previously addressed    Medication Instructions:  Patient is to take all scheduled medications on the day of surgery  Avoid all NSAIDs 1  week prior to surgery    RECOMMENDATION:  APPROVAL GIVEN to proceed with proposed procedure pending review of diagnostic evaluation.      38 minutes spent on the date of the encounter doing chart review, history and exam, documentation and further activities per the note    Subjective     HPI related to upcoming procedure:     Patient is a very pleasant 82-year-old male with PMH of prostate cancer s/p prostatectomy, stable LBBB, mild CKD stage IIIa, GERD, Blevins's esophagus who is here for preop for EGD.    Patient is getting surveillance EGD for Blevins's.      Preop Questions 12/2/2022   1. Have you ever had a heart attack or stroke? No   2. Have you ever had surgery on your heart or blood vessels, such as a stent placement, a coronary artery bypass, or surgery on an artery in your head, neck, heart, or legs? No   3. Do you have chest pain with activity? No   4. Do you have a history of  heart failure? No   5. Do you currently have a cold, bronchitis or symptoms of other infection? No   6. Do you have a cough, shortness of breath, or wheezing? No   7. Do you or anyone in your family have previous history of blood clots? No   8. Do you or does anyone in your family have a serious bleeding problem such as prolonged bleeding following surgeries or cuts? No   9. Have you ever had problems with anemia or been told to take iron pills? No   10. Have you had any abnormal blood loss such as black, tarry or bloody stools? No   11. Have you ever had a blood transfusion? No   12. Are you willing to have a blood transfusion if it is medically needed before, during, or after your surgery? Yes   13. Have you or any of your relatives ever had problems with anesthesia? No   14. Do you have sleep apnea, excessive snoring or daytime drowsiness? No   15. Do you have any artifical heart valves or other implanted medical devices like a pacemaker, defibrillator, or continuous glucose monitor? No   16. Do you have artificial joints? No    17. Are you allergic to latex? No       Health Care Directive:  Patient does not have a Health Care Directive or Living Will: As per HPI    Preoperative Review of :   reviewed - no record of controlled substances prescribed.    Review of Systems  CONSTITUTIONAL: NEGATIVE for fever, chills, change in weight  INTEGUMENTARY/SKIN: NEGATIVE for worrisome rashes, moles or lesions  EYES: NEGATIVE for vision changes or irritation  ENT/MOUTH: NEGATIVE for ear, mouth and throat problems  RESP: NEGATIVE for significant cough or SOB  CV: NEGATIVE for chest pain, palpitations or peripheral edema  GI: NEGATIVE for nausea, abdominal pain, heartburn, or change in bowel habits  : NEGATIVE for frequency, dysuria, or hematuria  MUSCULOSKELETAL: NEGATIVE for significant arthralgias or myalgia  NEURO: NEGATIVE for weakness, dizziness or paresthesias  ENDOCRINE: NEGATIVE for temperature intolerance, skin/hair changes  HEME: NEGATIVE for bleeding problems  PSYCHIATRIC: NEGATIVE for changes in mood or affect    Patient Active Problem List    Diagnosis Date Noted     GERD (gastroesophageal reflux disease) 10/24/2022     Priority: Medium     Constipation 10/24/2022     Priority: Medium     Dark stools 10/24/2022     Priority: Medium     Hyperlipidemia 10/24/2022     Priority: Medium     Chronic kidney disease, stage 3a (H) 02/17/2022     Priority: Medium     Diaphragmatic hernia 11/27/2019     Priority: Medium     Abnormal feces 11/11/2019     Priority: Medium     Diverticular disease of large intestine 07/25/2019     Priority: Medium     Hemorrhoids 07/25/2019     Priority: Medium     Polyp of colon 07/25/2019     Priority: Medium     Adenocarcinoma Of The Prostate Gland      Priority: Medium     Created by Conversion         Left Bundle Branch Block      Priority: Medium     Created by Conversion          No past medical history on file.  No past surgical history on file.  Current Outpatient Medications   Medication Sig Dispense  "Refill     omeprazole (PRILOSEC) 40 MG DR capsule Take 40 mg by mouth daily       rosuvastatin (CRESTOR) 5 MG tablet TAKE 1 TABLET BY MOUTH EVERY DAY 90 tablet 3       No Known Allergies     Social History     Tobacco Use     Smoking status: Never     Smokeless tobacco: Never   Substance Use Topics     Alcohol use: No     Family History   Problem Relation Age of Onset     Cancer Father      History   Drug Use No         Objective     /80 (BP Location: Right arm, Patient Position: Sitting, Cuff Size: Adult Regular)   Pulse 67   Temp 98  F (36.7  C) (Oral)   Resp 20   Ht 1.911 m (6' 3.25\")   Wt 106.9 kg (235 lb 9.6 oz)   SpO2 97%   BMI 29.25 kg/m      Physical Exam    GENERAL APPEARANCE: healthy, alert and no distress     EYES: EOMI,  PERRL     HENT: ear canals and TM's normal and nose and mouth without ulcers or lesions     NECK: no adenopathy, no asymmetry, masses, or scars and thyroid normal to palpation     RESP: lungs clear to auscultation - no rales, rhonchi or wheezes     CV: regular rates and rhythm, normal S1 S2, no S3 or S4 and no murmur, click or rub     ABDOMEN:  soft, nontender, no HSM or masses and bowel sounds normal     MS: extremities normal- no gross deformities noted, no evidence of inflammation in joints, FROM in all extremities.     SKIN: no suspicious lesions or rashes, (+) healing laceration on the scalp      NEURO: Normal strength and tone, sensory exam grossly normal, mentation intact and speech normal     PSYCH: mentation appears normal. and affect normal/bright     LYMPHATICS: No cervical adenopathy.     Recent Labs   Lab Test 02/17/22  1447 02/15/21  1438   HGB 14.9 14.7    203    138   POTASSIUM 4.6 4.4   CR 1.15 1.18        Diagnostics:  Labs pending at this time.  Results will be reviewed when available.   EKG: appears normal, NSR, Left Bundle Branch Block, unchanged from previous tracings    Revised Cardiac Risk Index (RCRI):  The patient has the following " serious cardiovascular risks for perioperative complications:   - No serious cardiac risks = 0 points     RCRI Interpretation: 0 points: Class I (very low risk - 0.4% complication rate)           Signed Electronically by: Amie Burden DO  Copy of this evaluation report is provided to requesting physician.

## 2022-12-04 DIAGNOSIS — R79.89 ELEVATED SERUM CREATININE: Primary | ICD-10-CM

## 2022-12-05 ENCOUNTER — HOSPITAL ENCOUNTER (OUTPATIENT)
Dept: ULTRASOUND IMAGING | Facility: HOSPITAL | Age: 82
Discharge: HOME | End: 2022-12-05
Attending: STUDENT IN AN ORGANIZED HEALTH CARE EDUCATION/TRAINING PROGRAM | Admitting: STUDENT IN AN ORGANIZED HEALTH CARE EDUCATION/TRAINING PROGRAM
Payer: MEDICARE

## 2022-12-05 ENCOUNTER — TELEPHONE (OUTPATIENT)
Dept: FAMILY MEDICINE | Facility: CLINIC | Age: 82
End: 2022-12-05

## 2022-12-05 DIAGNOSIS — E07.89 THYROID FULLNESS: ICD-10-CM

## 2022-12-05 LAB
ATRIAL RATE - MUSE: 62 BPM
DIASTOLIC BLOOD PRESSURE - MUSE: NORMAL MMHG
INTERPRETATION ECG - MUSE: NORMAL
P AXIS - MUSE: 33 DEGREES
PR INTERVAL - MUSE: 172 MS
QRS DURATION - MUSE: 160 MS
QT - MUSE: 452 MS
QTC - MUSE: 458 MS
R AXIS - MUSE: -16 DEGREES
SYSTOLIC BLOOD PRESSURE - MUSE: NORMAL MMHG
T AXIS - MUSE: 178 DEGREES
VENTRICULAR RATE- MUSE: 62 BPM

## 2022-12-05 PROCEDURE — 76536 US EXAM OF HEAD AND NECK: CPT

## 2022-12-05 NOTE — TELEPHONE ENCOUNTER
Called home phone and spoke to wife--phone got disconnected, attempted to call back and line was busy. I called patients work number listed and left message on his  to return our call. Please relay message below.       Amie Burden DO [facility] [place], [place]    Call patient,     Ok for surgery but kidney function is a bit up. Most likely in the setting of dehydration. Please increase water intake and repeat BMP in 2 weeks.order has been placed.     DO Zita Mata  [facility] Clinical Staff

## 2022-12-05 NOTE — TELEPHONE ENCOUNTER
----- Message from Amie Burden DO sent at 12/4/2022  9:26 PM CST -----  Call patient,     Ok for surgery but kidney function is a bit up. Most likely in the setting of dehydration. Please increase water intake and repeat BMP in 2 weeks.order has been placed.     Amie Burden DO

## 2022-12-09 ENCOUNTER — TRANSFERRED RECORDS (OUTPATIENT)
Dept: HEALTH INFORMATION MANAGEMENT | Facility: CLINIC | Age: 82
End: 2022-12-09

## 2022-12-11 DIAGNOSIS — E04.1 THYROID NODULE: ICD-10-CM

## 2022-12-19 ENCOUNTER — TELEPHONE (OUTPATIENT)
Dept: INTERNAL MEDICINE | Facility: CLINIC | Age: 82
End: 2022-12-19

## 2022-12-19 ENCOUNTER — OFFICE VISIT (OUTPATIENT)
Dept: INTERNAL MEDICINE | Facility: CLINIC | Age: 82
End: 2022-12-19
Payer: MEDICARE

## 2022-12-19 VITALS
HEART RATE: 68 BPM | BODY MASS INDEX: 29.21 KG/M2 | SYSTOLIC BLOOD PRESSURE: 124 MMHG | HEIGHT: 75 IN | DIASTOLIC BLOOD PRESSURE: 78 MMHG | WEIGHT: 234.9 LBS

## 2022-12-19 DIAGNOSIS — I10 ESSENTIAL HYPERTENSION: Primary | ICD-10-CM

## 2022-12-19 LAB
ALBUMIN SERPL BCG-MCNC: 4.1 G/DL (ref 3.5–5.2)
ALP SERPL-CCNC: 49 U/L (ref 40–129)
ALT SERPL W P-5'-P-CCNC: 14 U/L (ref 10–50)
ANION GAP SERPL CALCULATED.3IONS-SCNC: 8 MMOL/L (ref 7–15)
AST SERPL W P-5'-P-CCNC: 26 U/L (ref 10–50)
BILIRUB SERPL-MCNC: 0.9 MG/DL
BUN SERPL-MCNC: 24 MG/DL (ref 8–23)
CALCIUM SERPL-MCNC: 9 MG/DL (ref 8.8–10.2)
CHLORIDE SERPL-SCNC: 104 MMOL/L (ref 98–107)
CREAT SERPL-MCNC: 1.18 MG/DL (ref 0.67–1.17)
DEPRECATED HCO3 PLAS-SCNC: 26 MMOL/L (ref 22–29)
GFR SERPL CREATININE-BSD FRML MDRD: 62 ML/MIN/1.73M2
GLUCOSE SERPL-MCNC: 107 MG/DL (ref 70–99)
POTASSIUM SERPL-SCNC: 4.4 MMOL/L (ref 3.4–5.3)
PROT SERPL-MCNC: 6.8 G/DL (ref 6.4–8.3)
SODIUM SERPL-SCNC: 138 MMOL/L (ref 136–145)

## 2022-12-19 PROCEDURE — 36415 COLL VENOUS BLD VENIPUNCTURE: CPT | Performed by: INTERNAL MEDICINE

## 2022-12-19 PROCEDURE — 99213 OFFICE O/P EST LOW 20 MIN: CPT | Performed by: INTERNAL MEDICINE

## 2022-12-19 PROCEDURE — 80053 COMPREHEN METABOLIC PANEL: CPT | Performed by: INTERNAL MEDICINE

## 2022-12-19 NOTE — PROGRESS NOTES
"Assessment/Plan:    Hypertension with prior evidence of chronic kidney disease.  Today blood pressure 124/78.  The best way to preserve kidney function is to stay well-hydrated and to keep the blood pressure normal and to avoid nonsteroidal anti-inflammatory drugs available over-the-counter such as ibuprofen and naproxen sodium.  Acetaminophen or Tylenol safer for kidney discussed in detail with the patient and his wife who accompanies him here today.    Thyroid nodule 1.0 cm in size.  Annual wellness visit slated for February 2023 we will recheck thyroid ultrasound and then.  Right lower pole.  If increases in size may require fine-needle aspirate we had a detailed discussion regarding this and images were shown to the patient.    Loose stools alternating with constipation consider irritable bowel syndrome and formal [facility] consult with Dr. Sarah Cash.  Upper GI endoscopy is slated to occur soon for follow-up of Blevins's esophagus.  Suggest Metamucil 1 heaping tablespoon daily.  Omeprazole may be contributing to loose stools.    25 minutes spent on the date of the encounter doing chart review, patient visit and documentation     Subjective:  Emiliano Talley Jr. is a 82 year old male presents for the following health issues history as above.  Weight is stable.  No nocturnal diarrhea.  No blood in the stool or urine.    ROS:  Denies chest pain or shortness of breath medication list reviewed reconciled in the chart.    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  Undergraduate and PhD degrees from [employer].  [place].    Casey Ann MD  Internal Medicine    Answers for HPI/ROS submitted by the patient on 12/19/2022  Do you take any over the counter pain medicine?  : No  How many servings of fruits and vegetables do you eat daily?: 2-3  On average, how many sweetened beverages do you drink each day (Examples: soda, juice, sweet tea, etc.  Do NOT count diet or artificially sweetened beverages)?: 0  How many minutes a day do you exercise enough to make your heart beat faster?: 9 or less  How many days a week do you exercise enough to make your heart beat faster?: 3 or less  How many days per week do you miss taking your medication?: 0

## 2022-12-19 NOTE — LETTER
December 19, 2022      Emiliano Garciaradha Jr.  [address]  [place] [address]        Dear ,    We are writing to inform you of your test results.    Improved kidney function which is very good and normal.  Serum creatinine mildly elevated may be reflective of increased skeletal muscle mass and the best way to preserve kidney function is to stay well-hydrated and to keep blood pressure normal.  Also it is important to avoid nonsteroidal anti-inflammatory drugs available over-the-counter such as ibuprofen and naproxen sodium as these have been negative effect on renal blood flow through prostaglandin mechanism.  Acetaminophen or Tylenol is safer for the kidney.  All in all this test is reassuring and improved.     Resulted Orders   Comprehensive metabolic panel   Result Value Ref Range    Sodium 138 136 - 145 mmol/L    Potassium 4.4 3.4 - 5.3 mmol/L    Chloride 104 98 - 107 mmol/L    Carbon Dioxide (CO2) 26 22 - 29 mmol/L    Anion Gap 8 7 - 15 mmol/L    Urea Nitrogen 24.0 (H) 8.0 - 23.0 mg/dL    Creatinine 1.18 (H) 0.67 - 1.17 mg/dL    Calcium 9.0 8.8 - 10.2 mg/dL    Glucose 107 (H) 70 - 99 mg/dL    Alkaline Phosphatase 49 40 - 129 U/L    AST 26 10 - 50 U/L    ALT 14 10 - 50 U/L    Protein Total 6.8 6.4 - 8.3 g/dL    Albumin 4.1 3.5 - 5.2 g/dL    Bilirubin Total 0.9 <=1.2 mg/dL    GFR Estimate 62 >60 mL/min/1.73m2      Comment:      Effective December 21, 2021 eGFRcr in adults is calculated using the 2021 CKD-EPI creatinine equation which includes age and gender ( et al., NEJM, DOI: 10.1056/NOBKkj2109519)       If you have any questions or concerns, please call the clinic at the number listed above.       Sincerely,      Casey Ann MD

## 2022-12-19 NOTE — TELEPHONE ENCOUNTER
----- Message from Casey Ann MD sent at 12/19/2022  2:59 PM CST -----  Improved kidney function which is very good and normal.  Serum creatinine mildly elevated may be reflective of increased skeletal muscle mass and the best way to preserve kidney function is to stay well-hydrated and to keep blood pressure normal.  Also it is important to avoid nonsteroidal anti-inflammatory drugs available over-the-counter such as ibuprofen and naproxen sodium as these have been negative effect on renal blood flow through prostaglandin mechanism.  Acetaminophen or Tylenol is safer for the kidney.  All in all this test is reassuring and improved.  Please call patient for me.

## 2022-12-19 NOTE — TELEPHONE ENCOUNTER
Spoke to wife, Sandra (consent to communicate on file)- relayed results to wife as patient was not home.  Verbalized understanding & asked that results be mailed to home address  Results sent in mail

## 2022-12-19 NOTE — H&P (VIEW-ONLY)
"Assessment/Plan:    Hypertension with prior evidence of chronic kidney disease.  Today blood pressure 124/78.  The best way to preserve kidney function is to stay well-hydrated and to keep the blood pressure normal and to avoid nonsteroidal anti-inflammatory drugs available over-the-counter such as ibuprofen and naproxen sodium.  Acetaminophen or Tylenol safer for kidney discussed in detail with the patient and his wife who accompanies him here today.    Thyroid nodule 1.0 cm in size.  Annual wellness visit slated for February 2023 we will recheck thyroid ultrasound and then.  Right lower pole.  If increases in size may require fine-needle aspirate we had a detailed discussion regarding this and images were shown to the patient.    Loose stools alternating with constipation consider irritable bowel syndrome and formal [facility] consult with Dr. Sarah Cash.  Upper GI endoscopy is slated to occur soon for follow-up of Blevins's esophagus.  Suggest Metamucil 1 heaping tablespoon daily.  Omeprazole may be contributing to loose stools.    25 minutes spent on the date of the encounter doing chart review, patient visit and documentation     Subjective:  Emiliano Talley Jr. is a 82 year old male presents for the following health issues history as above.  Weight is stable.  No nocturnal diarrhea.  No blood in the stool or urine.    ROS:  Denies chest pain or shortness of breath medication list reviewed reconciled in the chart.    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  Undergraduate and PhD degrees from [employer].  [place].    Casey Ann MD  Internal Medicine    Answers for HPI/ROS submitted by the patient on 12/19/2022  Do you take any over the counter pain medicine?  : No  How many servings of fruits and vegetables do you eat daily?: 2-3  On average, how many sweetened beverages do you drink each day (Examples: soda, juice, sweet tea, etc.  Do NOT count diet or artificially sweetened beverages)?: 0  How many minutes a day do you exercise enough to make your heart beat faster?: 9 or less  How many days a week do you exercise enough to make your heart beat faster?: 3 or less  How many days per week do you miss taking your medication?: 0

## 2022-12-19 NOTE — PROGRESS NOTES
"  {PROVIDER CHARTING PREFERENCE:189274}    Subjective   Emiliano is a 82 year old accompanied by his spouse, presenting for the following health issues:  Follow Up      HPI     {SUPERLIST (Optional):852939}  {additonal problems for provider to add (Optional):078928}    Review of Systems   {ROS COMP (Optional):259003}      Objective    /78 (BP Location: Right arm, Patient Position: Sitting, Cuff Size: Adult Large)   Pulse 68   Ht 1.905 m (6' 3\")   Wt 106.5 kg (234 lb 14.4 oz)   BMI 29.36 kg/m    Body mass index is 29.36 kg/m .  Physical Exam   {Exam List (Optional):738439}    {Diagnostic Test Results (Optional):452078}    {AMBULATORY ATTESTATION (Optional):452871}            "

## 2022-12-21 ENCOUNTER — ANESTHESIA EVENT (OUTPATIENT)
Dept: SURGERY | Facility: CLINIC | Age: 82
End: 2022-12-21
Payer: MEDICARE

## 2022-12-22 ENCOUNTER — ANESTHESIA (OUTPATIENT)
Dept: SURGERY | Facility: CLINIC | Age: 82
End: 2022-12-22
Payer: MEDICARE

## 2022-12-22 ENCOUNTER — HOSPITAL ENCOUNTER (OUTPATIENT)
Facility: CLINIC | Age: 82
Discharge: HOME | End: 2022-12-22
Attending: INTERNAL MEDICINE | Admitting: INTERNAL MEDICINE
Payer: MEDICARE

## 2022-12-22 VITALS
HEART RATE: 66 BPM | TEMPERATURE: 98.1 F | SYSTOLIC BLOOD PRESSURE: 146 MMHG | RESPIRATION RATE: 20 BRPM | BODY MASS INDEX: 28.95 KG/M2 | WEIGHT: 232.8 LBS | DIASTOLIC BLOOD PRESSURE: 91 MMHG | OXYGEN SATURATION: 99 % | HEIGHT: 75 IN

## 2022-12-22 LAB — UPPER GI ENDOSCOPY: NORMAL

## 2022-12-22 PROCEDURE — 272N000001 HC OR GENERAL SUPPLY STERILE: Performed by: INTERNAL MEDICINE

## 2022-12-22 PROCEDURE — 999N000141 HC STATISTIC PRE-PROCEDURE NURSING ASSESSMENT: Performed by: INTERNAL MEDICINE

## 2022-12-22 PROCEDURE — 370N000017 HC ANESTHESIA TECHNICAL FEE, PER MIN: Performed by: INTERNAL MEDICINE

## 2022-12-22 PROCEDURE — 360N000075 HC SURGERY LEVEL 2, PER MIN: Performed by: INTERNAL MEDICINE

## 2022-12-22 PROCEDURE — 258N000003 HC RX IP 258 OP 636: Performed by: ANESTHESIOLOGY

## 2022-12-22 PROCEDURE — 710N000012 HC RECOVERY PHASE 2, PER MINUTE: Performed by: INTERNAL MEDICINE

## 2022-12-22 PROCEDURE — 250N000009 HC RX 250: Performed by: NURSE ANESTHETIST, CERTIFIED REGISTERED

## 2022-12-22 PROCEDURE — 250N000011 HC RX IP 250 OP 636: Performed by: NURSE ANESTHETIST, CERTIFIED REGISTERED

## 2022-12-22 PROCEDURE — 88305 TISSUE EXAM BY PATHOLOGIST: CPT | Mod: TC | Performed by: INTERNAL MEDICINE

## 2022-12-22 RX ORDER — NALOXONE HYDROCHLORIDE 0.4 MG/ML
0.2 INJECTION, SOLUTION INTRAMUSCULAR; INTRAVENOUS; SUBCUTANEOUS
Status: DISCONTINUED | OUTPATIENT
Start: 2022-12-22 | End: 2022-12-22 | Stop reason: HOSPADM

## 2022-12-22 RX ORDER — ONDANSETRON 4 MG/1
4 TABLET, ORALLY DISINTEGRATING ORAL EVERY 30 MIN PRN
Status: DISCONTINUED | OUTPATIENT
Start: 2022-12-22 | End: 2022-12-22 | Stop reason: HOSPADM

## 2022-12-22 RX ORDER — HYDROMORPHONE HCL IN WATER/PF 6 MG/30 ML
0.4 PATIENT CONTROLLED ANALGESIA SYRINGE INTRAVENOUS EVERY 5 MIN PRN
Status: DISCONTINUED | OUTPATIENT
Start: 2022-12-22 | End: 2022-12-22 | Stop reason: HOSPADM

## 2022-12-22 RX ORDER — SODIUM CHLORIDE, SODIUM LACTATE, POTASSIUM CHLORIDE, CALCIUM CHLORIDE 600; 310; 30; 20 MG/100ML; MG/100ML; MG/100ML; MG/100ML
INJECTION, SOLUTION INTRAVENOUS CONTINUOUS
Status: DISCONTINUED | OUTPATIENT
Start: 2022-12-22 | End: 2022-12-22 | Stop reason: HOSPADM

## 2022-12-22 RX ORDER — HYDROMORPHONE HCL IN WATER/PF 6 MG/30 ML
0.2 PATIENT CONTROLLED ANALGESIA SYRINGE INTRAVENOUS EVERY 5 MIN PRN
Status: DISCONTINUED | OUTPATIENT
Start: 2022-12-22 | End: 2022-12-22 | Stop reason: HOSPADM

## 2022-12-22 RX ORDER — PROPOFOL 10 MG/ML
INJECTION, EMULSION INTRAVENOUS CONTINUOUS PRN
Status: DISCONTINUED | OUTPATIENT
Start: 2022-12-22 | End: 2022-12-22

## 2022-12-22 RX ORDER — FENTANYL CITRATE 50 UG/ML
25 INJECTION, SOLUTION INTRAMUSCULAR; INTRAVENOUS EVERY 5 MIN PRN
Status: DISCONTINUED | OUTPATIENT
Start: 2022-12-22 | End: 2022-12-22 | Stop reason: HOSPADM

## 2022-12-22 RX ORDER — NALOXONE HYDROCHLORIDE 0.4 MG/ML
0.4 INJECTION, SOLUTION INTRAMUSCULAR; INTRAVENOUS; SUBCUTANEOUS
Status: DISCONTINUED | OUTPATIENT
Start: 2022-12-22 | End: 2022-12-22 | Stop reason: HOSPADM

## 2022-12-22 RX ORDER — ALBUTEROL SULFATE 0.83 MG/ML
2.5 SOLUTION RESPIRATORY (INHALATION) EVERY 4 HOURS PRN
Status: DISCONTINUED | OUTPATIENT
Start: 2022-12-22 | End: 2022-12-22 | Stop reason: HOSPADM

## 2022-12-22 RX ORDER — FENTANYL CITRATE 50 UG/ML
50 INJECTION, SOLUTION INTRAMUSCULAR; INTRAVENOUS EVERY 5 MIN PRN
Status: DISCONTINUED | OUTPATIENT
Start: 2022-12-22 | End: 2022-12-22 | Stop reason: HOSPADM

## 2022-12-22 RX ORDER — LIDOCAINE 40 MG/G
CREAM TOPICAL
Status: DISCONTINUED | OUTPATIENT
Start: 2022-12-22 | End: 2022-12-22 | Stop reason: HOSPADM

## 2022-12-22 RX ORDER — MEPERIDINE HYDROCHLORIDE 25 MG/ML
12.5 INJECTION INTRAMUSCULAR; INTRAVENOUS; SUBCUTANEOUS
Status: DISCONTINUED | OUTPATIENT
Start: 2022-12-22 | End: 2022-12-22 | Stop reason: HOSPADM

## 2022-12-22 RX ORDER — ONDANSETRON 2 MG/ML
4 INJECTION INTRAMUSCULAR; INTRAVENOUS EVERY 6 HOURS PRN
Status: DISCONTINUED | OUTPATIENT
Start: 2022-12-22 | End: 2022-12-22 | Stop reason: HOSPADM

## 2022-12-22 RX ORDER — HYDRALAZINE HYDROCHLORIDE 20 MG/ML
2.5-5 INJECTION INTRAMUSCULAR; INTRAVENOUS EVERY 10 MIN PRN
Status: DISCONTINUED | OUTPATIENT
Start: 2022-12-22 | End: 2022-12-22 | Stop reason: HOSPADM

## 2022-12-22 RX ORDER — ONDANSETRON 2 MG/ML
INJECTION INTRAMUSCULAR; INTRAVENOUS PRN
Status: DISCONTINUED | OUTPATIENT
Start: 2022-12-22 | End: 2022-12-22

## 2022-12-22 RX ORDER — FENTANYL CITRATE 50 UG/ML
25 INJECTION, SOLUTION INTRAMUSCULAR; INTRAVENOUS
Status: DISCONTINUED | OUTPATIENT
Start: 2022-12-22 | End: 2022-12-22 | Stop reason: HOSPADM

## 2022-12-22 RX ORDER — LIDOCAINE HYDROCHLORIDE 10 MG/ML
INJECTION, SOLUTION INFILTRATION; PERINEURAL PRN
Status: DISCONTINUED | OUTPATIENT
Start: 2022-12-22 | End: 2022-12-22

## 2022-12-22 RX ORDER — PROCHLORPERAZINE MALEATE 5 MG
5 TABLET ORAL EVERY 6 HOURS PRN
Status: DISCONTINUED | OUTPATIENT
Start: 2022-12-22 | End: 2022-12-22 | Stop reason: HOSPADM

## 2022-12-22 RX ORDER — ONDANSETRON 4 MG/1
4 TABLET, ORALLY DISINTEGRATING ORAL EVERY 6 HOURS PRN
Status: DISCONTINUED | OUTPATIENT
Start: 2022-12-22 | End: 2022-12-22 | Stop reason: HOSPADM

## 2022-12-22 RX ORDER — PROPOFOL 10 MG/ML
INJECTION, EMULSION INTRAVENOUS PRN
Status: DISCONTINUED | OUTPATIENT
Start: 2022-12-22 | End: 2022-12-22

## 2022-12-22 RX ORDER — ONDANSETRON 2 MG/ML
4 INJECTION INTRAMUSCULAR; INTRAVENOUS EVERY 30 MIN PRN
Status: DISCONTINUED | OUTPATIENT
Start: 2022-12-22 | End: 2022-12-22 | Stop reason: HOSPADM

## 2022-12-22 RX ORDER — HALOPERIDOL 5 MG/ML
1 INJECTION INTRAMUSCULAR
Status: DISCONTINUED | OUTPATIENT
Start: 2022-12-22 | End: 2022-12-22 | Stop reason: HOSPADM

## 2022-12-22 RX ORDER — FLUMAZENIL 0.1 MG/ML
0.2 INJECTION, SOLUTION INTRAVENOUS
Status: DISCONTINUED | OUTPATIENT
Start: 2022-12-22 | End: 2022-12-22 | Stop reason: HOSPADM

## 2022-12-22 RX ADMIN — LIDOCAINE HYDROCHLORIDE 2 ML: 10 INJECTION, SOLUTION INFILTRATION; PERINEURAL at 08:19

## 2022-12-22 RX ADMIN — ONDANSETRON 4 MG: 2 INJECTION INTRAMUSCULAR; INTRAVENOUS at 08:28

## 2022-12-22 RX ADMIN — PROPOFOL 150 MCG/KG/MIN: 10 INJECTION, EMULSION INTRAVENOUS at 08:19

## 2022-12-22 RX ADMIN — SODIUM CHLORIDE, POTASSIUM CHLORIDE, SODIUM LACTATE AND CALCIUM CHLORIDE: 600; 310; 30; 20 INJECTION, SOLUTION INTRAVENOUS at 07:43

## 2022-12-22 RX ADMIN — PROPOFOL 30 MG: 10 INJECTION, EMULSION INTRAVENOUS at 08:19

## 2022-12-22 ASSESSMENT — ACTIVITIES OF DAILY LIVING (ADL)
ADLS_ACUITY_SCORE: 35
ADLS_ACUITY_SCORE: 33

## 2022-12-22 NOTE — ANESTHESIA PREPROCEDURE EVALUATION
Anesthesia Pre-Procedure Evaluation    Patient: Emiliano Talley Jr.   MRN: [id number] : 1940        Procedure : Procedure(s):  ESOPHAGOGASTRODUODENOSCOPY          Past Medical History:   Diagnosis Date     Arthritis      Heart disease      LBBB (left bundle branch block)       Past Surgical History:   Procedure Laterality Date     ENT SURGERY       HERNIA REPAIR       PROSTATE SURGERY        No Known Allergies   Social History     Tobacco Use     Smoking status: Never     Smokeless tobacco: Never   Substance Use Topics     Alcohol use: No      Wt Readings from Last 1 Encounters:   22 105.6 kg (232 lb 12.8 oz)        Anesthesia Evaluation            ROS/MED HX  ENT/Pulmonary:  - neg pulmonary ROS     Neurologic:  - neg neurologic ROS     Cardiovascular:     (+) hypertension-----    METS/Exercise Tolerance:     Hematologic:  - neg hematologic  ROS     Musculoskeletal:  - neg musculoskeletal ROS     GI/Hepatic: Comment: Blevins's esophagus      Renal/Genitourinary:     (+) renal disease, type: CRI,     Endo:     (+) thyroid problem,  thyroid nodule,     Psychiatric/Substance Use:  - neg psychiatric ROS     Infectious Disease:  - neg infectious disease ROS     Malignancy:  - neg malignancy ROS     Other:            Physical Exam    Airway        Mallampati: I   TM distance: > 3 FB      Respiratory Devices and Support         Dental  no notable dental history         Cardiovascular   cardiovascular exam normal          Pulmonary   pulmonary exam normal                OUTSIDE LABS:  CBC:   Lab Results   Component Value Date    WBC 8.0 2022    WBC 8.7 2022    HGB 14.7 2022    HGB 14.9 2022    HCT 44.0 2022    HCT 44.3 2022     2022     2022     BMP:   Lab Results   Component Value Date     2022     2022    POTASSIUM 4.4 2022    POTASSIUM 4.6 2022    CHLORIDE 104 2022    CHLORIDE 106 2022    CO2 26  12/19/2022    CO2 26 12/02/2022    BUN 24.0 (H) 12/19/2022    BUN 27.0 (H) 12/02/2022    CR 1.18 (H) 12/19/2022    CR 1.24 (H) 12/02/2022     (H) 12/19/2022    GLC 94 12/02/2022     COAGS: No results found for: PTT, INR, FIBR  POC: No results found for: BGM, HCG, HCGS  HEPATIC:   Lab Results   Component Value Date    ALBUMIN 4.1 12/19/2022    PROTTOTAL 6.8 12/19/2022    ALT 14 12/19/2022    AST 26 12/19/2022    ALKPHOS 49 12/19/2022    BILITOTAL 0.9 12/19/2022     OTHER:   Lab Results   Component Value Date    A1C 5.3 11/06/2014    BRIDGETTE 9.0 12/19/2022    TSH 2.23 02/17/2022    SED 7 10/29/2020       Anesthesia Plan    ASA Status:  2      Anesthesia Type: MAC.              Consents    Anesthesia Plan(s) and associated risks, benefits, and realistic alternatives discussed. Questions answered and patient/representative(s) expressed understanding.    - Discussed:     - Discussed with:  Patient         Postoperative Care            Comments:                Sherlyn Cole MD

## 2022-12-22 NOTE — PROCEDURES
[facility] EGD Report    See linked EGD report for details of the procedure    H/O Blevins's esophagus-? Subtle change at the GE junction-biopsies obtained  Small hiatal hernia  Rest of exam normal to second duodenum    Continue present medications  We will notify you with the results of the biopsies.  If no dysplasia or Blevins's, would not perform further EGD surveillance.    Robert Natarajan MD  [facility]

## 2022-12-22 NOTE — ANESTHESIA POSTPROCEDURE EVALUATION
Patient: Emiliano Talley Jr.    Procedure: Procedure(s):  ESOPHAGOGASTRODUODENOSCOPY WITH BIOPSIES       Anesthesia Type:  MAC    Note:     Postop Pain Control: Uneventful            Sign Out: Well controlled pain   PONV: No   Neuro/Psych: Uneventful            Sign Out: Acceptable/Baseline neuro status   Airway/Respiratory: Uneventful            Sign Out: Acceptable/Baseline resp. status   CV/Hemodynamics: Uneventful            Sign Out: Acceptable CV status; No obvious hypovolemia; No obvious fluid overload   Other NRE: NONE   DID A NON-ROUTINE EVENT OCCUR? No           Last vitals:  Vitals Value Taken Time   /91 12/22/22 0847   Temp 36.7  C (98.1  F) 12/22/22 0847   Pulse     Resp 20 12/22/22 0847   SpO2 99 % 12/22/22 0847       Electronically Signed By: Sherlyn Cole MD  December 22, 2022  9:08 AM

## 2022-12-22 NOTE — PROVIDER NOTIFICATION
Pt had a recent fall with taking out the garbage and has a healing head lac with steri strips and staples. It appears c/d/I

## 2022-12-22 NOTE — ANESTHESIA CARE TRANSFER NOTE
Patient: Emiliano Talley Jr.    Procedure: Procedure(s):  ESOPHAGOGASTRODUODENOSCOPY WITH BIOPSIES       Diagnosis: Blevins's esophagus [K22.70]  Diagnosis Additional Information: No value filed.    Anesthesia Type:   MAC     Note:    Oropharynx: oropharynx clear of all foreign objects and spontaneously breathing  Level of Consciousness: awake  Oxygen Supplementation: face mask    Independent Airway: airway patency satisfactory and stable  Dentition: dentition unchanged  Vital Signs Stable: post-procedure vital signs reviewed and stable  Report to RN Given: handoff report given  Patient transferred to: Phase II    Handoff Report: Identifed the Patient, Identified the Reponsible Provider, Reviewed the pertinent medical history, Discussed the surgical course, Reviewed Intra-OP anesthesia mangement and issues during anesthesia, Set expectations for post-procedure period and Allowed opportunity for questions and acknowledgement of understanding      Vitals:  Vitals Value Taken Time   /72 12/22/22 0830   Temp 36.4  C (97.6  F) 12/22/22 0830   Pulse 75    Resp 18 12/22/22 0830   SpO2 100 % 12/22/22 0830       Electronically Signed By: LALITHA Nolan CRNA  December 22, 2022  8:34 AM

## 2022-12-22 NOTE — INTERVAL H&P NOTE
[facility] Pre-Procedure Note    Reason for procedure: EGD for Blevins's surveillance    History and Physical Reviewed: Reviewed linked H&P--no changes since that exam.    Pre-sedation assessment:    General: alert, appears stated age, and cooperative  Airway: normal  Heart: regular rate and rhythm  Lungs: clear to auscultation bilaterally  Abdomen:  NT/ND; normal bowel sounds    Sedation Plan based on assessment: Moderate    Mallampati score: Class II (visualization of the soft palate, fauces, and uvula)          ASA Classification: ASA 2 - Patient with mild systemic disease with no functional limitations    Impression: Patient deemed adequate candidate for conscious sedation.    Risks, benefits and alternatives were discussed with the patient and informed consent was obtained.    Plan: esophagogastroduodenoscopy with biopsies              Robert Natarajan MD  Thank you for the opportunity to participate in the care of this patient.   Please feel free to call me with any questions or concerns.  Phone number [phone].

## 2022-12-23 LAB
PATH REPORT.COMMENTS IMP SPEC: NORMAL
PATH REPORT.FINAL DX SPEC: NORMAL
PATH REPORT.GROSS SPEC: NORMAL
PATH REPORT.MICROSCOPIC SPEC OTHER STN: NORMAL
PATH REPORT.RELEVANT HX SPEC: NORMAL
PHOTO IMAGE: NORMAL

## 2022-12-23 PROCEDURE — 88305 TISSUE EXAM BY PATHOLOGIST: CPT | Mod: 26 | Performed by: PATHOLOGY

## 2023-01-06 DIAGNOSIS — E78.00 HYPERCHOLESTEREMIA: ICD-10-CM

## 2023-01-06 DIAGNOSIS — I10 ESSENTIAL HYPERTENSION: Primary | ICD-10-CM

## 2023-01-08 RX ORDER — ROSUVASTATIN CALCIUM 5 MG/1
5 TABLET, COATED ORAL DAILY
Qty: 90 TABLET | Refills: 3 | Status: SHIPPED | OUTPATIENT
Start: 2023-01-08 | End: 2024-01-03

## 2023-01-08 NOTE — TELEPHONE ENCOUNTER
"Routing refill request for Omeprazole to provider for review/approval because:  Medication is reported/historical  Patient reported 12/21/2020.    Passed protocol:  Rosuvastatin:  Last Written Prescription Date:  5/15/2022 Change to mail order pharmacy noted.   Last Fill Quantity: 90,  # refills: 3     Last office visit provider: 12/19/2022 with PCP Dr RADHA Ann      Requested Prescriptions   Pending Prescriptions Disp Refills     rosuvastatin (CRESTOR) 5 MG tablet 90 tablet 3     Sig: Take 1 tablet (5 mg) by mouth daily       Statins Protocol Passed - 1/6/2023 11:05 AM        Passed - LDL on file in past 12 months     Recent Labs   Lab Test 02/17/22  1447   LDL 84             Passed - No abnormal creatine kinase in past 12 months     No lab results found.             Passed - Recent (12 mo) or future (30 days) visit within the authorizing provider's specialty     Patient has had an office visit with the authorizing provider or a provider within the authorizing providers department within the previous 12 mos or has a future within next 30 days. See \"Patient Info\" tab in inbasket, or \"Choose Columns\" in Meds & Orders section of the refill encounter.              Passed - Medication is active on med list        Passed - Patient is age 18 or older           omeprazole (PRILOSEC) 40 MG DR capsule       Sig: Take 1 capsule (40 mg) by mouth daily       PPI Protocol Passed - 1/6/2023 11:05 AM        Passed - Not on Clopidogrel (unless Pantoprazole ordered)        Passed - No diagnosis of osteoporosis on record        Passed - Recent (12 mo) or future (30 days) visit within the authorizing provider's specialty     Patient has had an office visit with the authorizing provider or a provider within the authorizing providers department within the previous 12 mos or has a future within next 30 days. See \"Patient Info\" tab in inbasket, or \"Choose Columns\" in Meds & Orders section of the refill encounter.              Passed - " Medication is active on med list        Passed - Patient is age 18 or older             Diamond Andersen RN 01/08/23 5:31 PM

## 2023-01-09 RX ORDER — OMEPRAZOLE 40 MG/1
40 CAPSULE, DELAYED RELEASE ORAL DAILY
Qty: 90 CAPSULE | Refills: 11 | Status: SHIPPED | OUTPATIENT
Start: 2023-01-09 | End: 2024-03-06

## 2023-02-20 ENCOUNTER — OFFICE VISIT (OUTPATIENT)
Dept: INTERNAL MEDICINE | Facility: CLINIC | Age: 83
End: 2023-02-20
Payer: MEDICARE

## 2023-02-20 VITALS
BODY MASS INDEX: 27.96 KG/M2 | HEIGHT: 76 IN | DIASTOLIC BLOOD PRESSURE: 88 MMHG | WEIGHT: 229.6 LBS | HEART RATE: 68 BPM | SYSTOLIC BLOOD PRESSURE: 150 MMHG | OXYGEN SATURATION: 98 %

## 2023-02-20 DIAGNOSIS — Z00.00 ROUTINE GENERAL MEDICAL EXAMINATION AT A HEALTH CARE FACILITY: Primary | ICD-10-CM

## 2023-02-20 DIAGNOSIS — Z12.5 SCREENING FOR PROSTATE CANCER: ICD-10-CM

## 2023-02-20 LAB
ALBUMIN SERPL BCG-MCNC: 4.1 G/DL (ref 3.5–5.2)
ALBUMIN UR-MCNC: NEGATIVE MG/DL
ALP SERPL-CCNC: 51 U/L (ref 40–129)
ALT SERPL W P-5'-P-CCNC: 9 U/L (ref 10–50)
ANION GAP SERPL CALCULATED.3IONS-SCNC: 10 MMOL/L (ref 7–15)
APPEARANCE UR: CLEAR
AST SERPL W P-5'-P-CCNC: 23 U/L (ref 10–50)
BILIRUB SERPL-MCNC: 1 MG/DL
BILIRUB UR QL STRIP: NEGATIVE
BUN SERPL-MCNC: 27.7 MG/DL (ref 8–23)
CALCIUM SERPL-MCNC: 8.9 MG/DL (ref 8.8–10.2)
CHLORIDE SERPL-SCNC: 105 MMOL/L (ref 98–107)
CHOLEST SERPL-MCNC: 158 MG/DL
COLOR UR AUTO: YELLOW
CREAT SERPL-MCNC: 1.32 MG/DL (ref 0.67–1.17)
DEPRECATED HCO3 PLAS-SCNC: 25 MMOL/L (ref 22–29)
ERYTHROCYTE [DISTWIDTH] IN BLOOD BY AUTOMATED COUNT: 12.1 % (ref 10–15)
GFR SERPL CREATININE-BSD FRML MDRD: 54 ML/MIN/1.73M2
GLUCOSE SERPL-MCNC: 97 MG/DL (ref 70–99)
GLUCOSE UR STRIP-MCNC: NEGATIVE MG/DL
HCT VFR BLD AUTO: 41.6 % (ref 40–53)
HDLC SERPL-MCNC: 63 MG/DL
HGB BLD-MCNC: 14.1 G/DL (ref 13.3–17.7)
HGB UR QL STRIP: NEGATIVE
KETONES UR STRIP-MCNC: NEGATIVE MG/DL
LDLC SERPL CALC-MCNC: 82 MG/DL
LEUKOCYTE ESTERASE UR QL STRIP: NEGATIVE
MCH RBC QN AUTO: 32.5 PG (ref 26.5–33)
MCHC RBC AUTO-ENTMCNC: 33.9 G/DL (ref 31.5–36.5)
MCV RBC AUTO: 96 FL (ref 78–100)
NITRATE UR QL: NEGATIVE
NONHDLC SERPL-MCNC: 95 MG/DL
PH UR STRIP: 6 [PH] (ref 5–8)
PLATELET # BLD AUTO: 185 10E3/UL (ref 150–450)
POTASSIUM SERPL-SCNC: 4.3 MMOL/L (ref 3.4–5.3)
PROT SERPL-MCNC: 6.7 G/DL (ref 6.4–8.3)
PSA SERPL-MCNC: <0.01 NG/ML
RBC # BLD AUTO: 4.34 10E6/UL (ref 4.4–5.9)
SODIUM SERPL-SCNC: 140 MMOL/L (ref 136–145)
SP GR UR STRIP: 1.02 (ref 1–1.03)
TRIGL SERPL-MCNC: 66 MG/DL
TSH SERPL DL<=0.005 MIU/L-ACNC: 2.29 UIU/ML (ref 0.3–4.2)
UROBILINOGEN UR STRIP-ACNC: 0.2 E.U./DL
WBC # BLD AUTO: 7.2 10E3/UL (ref 4–11)

## 2023-02-20 PROCEDURE — 81003 URINALYSIS AUTO W/O SCOPE: CPT | Performed by: INTERNAL MEDICINE

## 2023-02-20 PROCEDURE — 84443 ASSAY THYROID STIM HORMONE: CPT | Performed by: INTERNAL MEDICINE

## 2023-02-20 PROCEDURE — 36415 COLL VENOUS BLD VENIPUNCTURE: CPT | Performed by: INTERNAL MEDICINE

## 2023-02-20 PROCEDURE — 80061 LIPID PANEL: CPT | Performed by: INTERNAL MEDICINE

## 2023-02-20 PROCEDURE — 85027 COMPLETE CBC AUTOMATED: CPT | Performed by: INTERNAL MEDICINE

## 2023-02-20 PROCEDURE — G0103 PSA SCREENING: HCPCS | Performed by: INTERNAL MEDICINE

## 2023-02-20 PROCEDURE — G0439 PPPS, SUBSEQ VISIT: HCPCS | Performed by: INTERNAL MEDICINE

## 2023-02-20 PROCEDURE — 80053 COMPREHEN METABOLIC PANEL: CPT | Performed by: INTERNAL MEDICINE

## 2023-02-20 RX ORDER — ALBUTEROL SULFATE 90 UG/1
AEROSOL, METERED RESPIRATORY (INHALATION)
COMMUNITY
Start: 2023-01-01 | End: 2023-11-13

## 2023-02-20 ASSESSMENT — ENCOUNTER SYMPTOMS
HEARTBURN: 0
ARTHRALGIAS: 0
CONSTIPATION: 0
SORE THROAT: 0
DIZZINESS: 0
MYALGIAS: 0
HEMATURIA: 0
PARESTHESIAS: 0
JOINT SWELLING: 0
PALPITATIONS: 0
HEADACHES: 0
COUGH: 0
EYE PAIN: 0
FEVER: 0
WEAKNESS: 0
NAUSEA: 0
CHILLS: 0
ABDOMINAL PAIN: 0
SHORTNESS OF BREATH: 0
FREQUENCY: 0
DIARRHEA: 0
DYSURIA: 0
NERVOUS/ANXIOUS: 0

## 2023-02-20 ASSESSMENT — ACTIVITIES OF DAILY LIVING (ADL): CURRENT_FUNCTION: NO ASSISTANCE NEEDED

## 2023-02-20 ASSESSMENT — PAIN SCALES - GENERAL: PAINLEVEL: NO PAIN (0)

## 2023-02-20 NOTE — PROGRESS NOTES
Annual Wellness Visit:  Emiliano Talley Jr.  is a 82 year old male  who presents for an annual wellness visit.  Annual wellness visit and physical examination.  We had a good discussion.  Today check hemogram comprehensive metabolic profile plus urinalysis lipid panel PSA TSH.    Physician and patient sharing was accomplished.    Advance care planning done.    Falls risk assessment also accomplished.    Cognitive assessment was completed and the current provider this examiner and patient sharing was also completed.  Assessment/Plan:  Annual wellness visit and physical examination.  Screen for prostate cancer.    Subjective:   Medical History:    Non-smoker    No alcohol.    No known drug allergies.    History of right inguinal hernia repair without anesthetic complications.    Open prostatectomy for prostate cancer over 20 years ago Dr. PARAMJIT venegas now retired from [facility].    History of hypertension hyperlipidemia and Blevins's esophagus.  Followed by Dr. Banks from [facility].  No sign of progression to esophageal cancer.    History of left bundle branch block with negative cardiac work-up.  1 daughter a cardiologist at the [facility].  Past Medical History:   Diagnosis Date     Arthritis      Heart disease      LBBB (left bundle branch block)      Current Outpatient Medications   Medication     omeprazole (PRILOSEC) 40 MG DR capsule     rosuvastatin (CRESTOR) 5 MG tablet     albuterol (PROAIR HFA/PROVENTIL HFA/VENTOLIN HFA) 108 (90 Base) MCG/ACT inhaler     Current Facility-Administered Medications   Medication     acetaminophen (TYLENOL) tablet 650 mg     Immunization History   Administered Date(s) Administered     COVID-19 Vaccine 12+ (Pfizer) 02/18/2021, 03/11/2021, 11/26/2021     COVID-19 Vaccine Bivalent Booster 12+ (Pfizer) 12/02/2022     DT (PEDS <7y) 01/02/2004     Pneumo Conj 13-V (2010&after) 11/09/2015     Pneumococcal 23 valent 01/02/2004     Td (Adult), Adsorbed 01/02/2004      Td,adult,historic,unspecified 2004, 2013     Tdap (Adacel,Boostrix) 2013       Surgical History:  Past Surgical History:   Procedure Laterality Date     ENT SURGERY       ESOPHAGOSCOPY, GASTROSCOPY, DUODENOSCOPY (EGD), COMBINED N/A 2022    Procedure: ESOPHAGOGASTRODUODENOSCOPY WITH BIOPSIES;  Surgeon: Robert Natarajan MD;  Location: [facility] Main OR     HERNIA REPAIR       PROSTATE SURGERY          Family History:  Mother  89 after hip fracture.    Father  85 prostate cancer.  7 children 21 grandchildren wife well.    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  Undergraduate degree from [employer].  PhD in philosophy from [employer].    Law degree from [employer].  .  Still practicing.    Health Maintenances:  Immunizations reviewed and up-to-date colonoscopy up-to-date no sign of cancer.  Reviewed    Objective: 138/76.  72.  18 is a respiration temperature normal blood pressure normalized with second reading.  Weight height stable.  6 foot 3 inches tall 232 pounds.  Stable.  Muscle tone is good neuromuscular tone is good.  Somewhat slow in movement and required minimal assistance going from chair to exam table and vice versa.  Unsteadiness of gait noted      Chest clear heart tones normal abdomen benign extremities free of edema neck veins nondistended no thyromegaly no scleral icterus no carotid bruits rest of examination was negative.  Skin negative lymph negative neuro negative psych normal easily conversant good spirited quiet soft-spoken.  Seborrheic like eruption on his scalp central part of his chest scattered benign moles also noted on the back.  Rectal examination was negative prostate tissue is not palpable rest the rectal exam negative no nodularity nothing to suggest malignancy genital examination was negative testicular exam normal no groin hernias.  Good pulse noted in all 4 extremities no  carotid bruits thyromegaly or neck vein distention.  He appeared well younger than stated age.  Easily conversant good spirited highly intelligent.  We had a good discussion.    Casey Ann MD    Internal Medicine

## 2023-02-20 NOTE — LETTER
February 21, 2023      Emiliano Talley Jr.  [address]  [place] [address]        Dear ,    We are writing to inform you of your test results.    All quite good but there is evidence for mild chronic kidney disease with slight rise in serum creatinine to 1.32.  The best way to preserve kidney function is to stay well-hydrated and to avoid nonsteroidal anti-inflammatory drugs available over-the-counter like ibuprofen and naproxen sodium.  Keeping blood pressure normal is also protective for the kidney.  Acetaminophen or Tylenol is safer for kidney function.     All in all labs are quite good and stable. No sign of prostate cancer recurrence    Resulted Orders   CBC with platelets   Result Value Ref Range    WBC Count 7.2 4.0 - 11.0 10e3/uL    RBC Count 4.34 (L) 4.40 - 5.90 10e6/uL    Hemoglobin 14.1 13.3 - 17.7 g/dL    Hematocrit 41.6 40.0 - 53.0 %    MCV 96 78 - 100 fL    MCH 32.5 26.5 - 33.0 pg    MCHC 33.9 31.5 - 36.5 g/dL    RDW 12.1 10.0 - 15.0 %    Platelet Count 185 150 - 450 10e3/uL   Comprehensive metabolic panel   Result Value Ref Range    Sodium 140 136 - 145 mmol/L    Potassium 4.3 3.4 - 5.3 mmol/L    Chloride 105 98 - 107 mmol/L    Carbon Dioxide (CO2) 25 22 - 29 mmol/L    Anion Gap 10 7 - 15 mmol/L    Urea Nitrogen 27.7 (H) 8.0 - 23.0 mg/dL    Creatinine 1.32 (H) 0.67 - 1.17 mg/dL    Calcium 8.9 8.8 - 10.2 mg/dL    Glucose 97 70 - 99 mg/dL    Alkaline Phosphatase 51 40 - 129 U/L    AST 23 10 - 50 U/L    ALT 9 (L) 10 - 50 U/L    Protein Total 6.7 6.4 - 8.3 g/dL    Albumin 4.1 3.5 - 5.2 g/dL    Bilirubin Total 1.0 <=1.2 mg/dL    GFR Estimate 54 (L) >60 mL/min/1.73m2      Comment:      eGFR calculated using 2021 CKD-EPI equation.   Lipid panel reflex to direct LDL Fasting   Result Value Ref Range    Cholesterol 158 <200 mg/dL    Triglycerides 66 <150 mg/dL    Direct Measure HDL 63 >=40 mg/dL    LDL Cholesterol Calculated 82 <=100 mg/dL    Non HDL Cholesterol 95 <130 mg/dL    Narrative     Cholesterol  Desirable:  <200 mg/dL    Triglycerides  Normal:  Less than 150 mg/dL  Borderline High:  150-199 mg/dL  High:  200-499 mg/dL  Very High:  Greater than or equal to 500 mg/dL    Direct Measure HDL  Female:  Greater than or equal to 50 mg/dL   Male:  Greater than or equal to 40 mg/dL    LDL Cholesterol  Desirable:  <100mg/dL  Above Desirable:  100-129 mg/dL   Borderline High:  130-159 mg/dL   High:  160-189 mg/dL   Very High:  >= 190 mg/dL    Non HDL Cholesterol  Desirable:  130 mg/dL  Above Desirable:  130-159 mg/dL  Borderline High:  160-189 mg/dL  High:  190-219 mg/dL  Very High:  Greater than or equal to 220 mg/dL   TSH   Result Value Ref Range    TSH 2.29 0.30 - 4.20 uIU/mL   UA reflex to Microscopic and Culture   Result Value Ref Range    Color Urine Yellow Colorless, Straw, Light Yellow, Yellow    Appearance Urine Clear Clear    Glucose Urine Negative Negative mg/dL    Bilirubin Urine Negative Negative    Ketones Urine Negative Negative mg/dL    Specific Gravity Urine 1.020 1.005 - 1.030    Blood Urine Negative Negative    pH Urine 6.0 5.0 - 8.0    Protein Albumin Urine Negative Negative mg/dL    Urobilinogen Urine 0.2 0.2, 1.0 E.U./dL    Nitrite Urine Negative Negative    Leukocyte Esterase Urine Negative Negative    Narrative    Microscopic not indicated   Prostate Specific Antigen Screen   Result Value Ref Range    Prostate Specific Antigen Screen <0.01 ng/mL      Comment:      No reference ranges have been established for patients over 80 years.    Narrative    This result is obtained using the Roche Elecsys total PSA method on the roly e801 immunoassay analyzer. Results obtained with different assay methods or kits cannot be used interchangeably.       If you have any questions or concerns, please call the clinic at the number listed above.       Sincerely,      Casey Ann MD

## 2023-02-21 ENCOUNTER — TELEPHONE (OUTPATIENT)
Dept: INTERNAL MEDICINE | Facility: CLINIC | Age: 83
End: 2023-02-21
Payer: MEDICARE

## 2023-02-21 NOTE — TELEPHONE ENCOUNTER
----- Message from Casey Ann MD sent at 2/20/2023  1:50 PM CST -----  All quite good but there is evidence for mild chronic kidney disease with slight rise in serum creatinine to 1.32.  The best way to preserve kidney function is to stay well-hydrated and to avoid nonsteroidal anti-inflammatory drugs available over-the-counter like ibuprofen and naproxen sodium.  Keeping blood pressure normal is also protective for the kidney.  Acetaminophen or Tylenol is safer for kidney function.    All in all labs are quite good and stable.  PSA level remains pending.      Casey Ann MD   2/20/2023  1:57 PM CST Back to Top      All very good and no sign of prostate cancer recurrence.

## 2023-02-21 NOTE — TELEPHONE ENCOUNTER
Contacted patient and reviewed results and message from PCP with both patient and spouse.  Emphasized Tylenol better for patient than NSAIDS and explained why.  Patient verbalized understanding and has no further questions.

## 2023-06-21 ENCOUNTER — TRANSFERRED RECORDS (OUTPATIENT)
Dept: HEALTH INFORMATION MANAGEMENT | Facility: CLINIC | Age: 83
End: 2023-06-21
Payer: MEDICARE

## 2023-11-13 DIAGNOSIS — J44.9 CHRONIC OBSTRUCTIVE PULMONARY DISEASE, UNSPECIFIED COPD TYPE (H): Primary | ICD-10-CM

## 2023-11-13 RX ORDER — ALBUTEROL SULFATE 90 UG/1
2 AEROSOL, METERED RESPIRATORY (INHALATION) EVERY 4 HOURS PRN
Qty: 18 G | Refills: 11 | Status: SHIPPED | OUTPATIENT
Start: 2023-11-13

## 2023-11-20 DIAGNOSIS — J44.9 CHRONIC OBSTRUCTIVE PULMONARY DISEASE, UNSPECIFIED COPD TYPE (H): Primary | ICD-10-CM

## 2023-11-20 RX ORDER — FLUTICASONE PROPIONATE 110 UG/1
1 AEROSOL, METERED RESPIRATORY (INHALATION) 2 TIMES DAILY
Qty: 12 G | Refills: 11 | Status: SHIPPED | OUTPATIENT
Start: 2023-11-20

## 2023-11-28 ENCOUNTER — OFFICE VISIT (OUTPATIENT)
Dept: INTERNAL MEDICINE | Facility: CLINIC | Age: 83
End: 2023-11-28
Payer: MEDICARE

## 2023-11-28 ENCOUNTER — HOSPITAL ENCOUNTER (OUTPATIENT)
Dept: CT IMAGING | Facility: HOSPITAL | Age: 83
Discharge: HOME | End: 2023-11-28
Attending: INTERNAL MEDICINE | Admitting: INTERNAL MEDICINE
Payer: MEDICARE

## 2023-11-28 VITALS
BODY MASS INDEX: 26.84 KG/M2 | HEIGHT: 76 IN | OXYGEN SATURATION: 100 % | WEIGHT: 220.4 LBS | HEART RATE: 77 BPM | DIASTOLIC BLOOD PRESSURE: 80 MMHG | TEMPERATURE: 97.8 F | RESPIRATION RATE: 14 BRPM | SYSTOLIC BLOOD PRESSURE: 122 MMHG

## 2023-11-28 DIAGNOSIS — C61 MALIGNANT NEOPLASM OF PROSTATE (H): ICD-10-CM

## 2023-11-28 DIAGNOSIS — R19.4 RECENT CHANGE IN FREQUENCY OF BOWEL MOVEMENTS: ICD-10-CM

## 2023-11-28 DIAGNOSIS — R05.9 COUGH, UNSPECIFIED TYPE: ICD-10-CM

## 2023-11-28 DIAGNOSIS — R19.4 RECENT CHANGE IN FREQUENCY OF BOWEL MOVEMENTS: Primary | ICD-10-CM

## 2023-11-28 PROCEDURE — 250N000011 HC RX IP 250 OP 636: Performed by: INTERNAL MEDICINE

## 2023-11-28 PROCEDURE — 74177 CT ABD & PELVIS W/CONTRAST: CPT | Mod: MG

## 2023-11-28 PROCEDURE — G1010 CDSM STANSON: HCPCS

## 2023-11-28 PROCEDURE — 99214 OFFICE O/P EST MOD 30 MIN: CPT | Performed by: INTERNAL MEDICINE

## 2023-11-28 RX ORDER — RESPIRATORY SYNCYTIAL VIRUS VACCINE 120MCG/0.5
0.5 KIT INTRAMUSCULAR ONCE
Qty: 1 EACH | Refills: 0 | Status: CANCELLED | OUTPATIENT
Start: 2023-11-28 | End: 2023-11-28

## 2023-11-28 RX ORDER — LACTULOSE 10 G/15ML
10 SOLUTION ORAL
COMMUNITY
Start: 2023-11-02

## 2023-11-28 RX ORDER — IOPAMIDOL 755 MG/ML
90 INJECTION, SOLUTION INTRAVASCULAR ONCE
Status: COMPLETED | OUTPATIENT
Start: 2023-11-28 | End: 2023-11-28

## 2023-11-28 RX ORDER — ASPIRIN 81 MG/1
TABLET ORAL
COMMUNITY
Start: 2023-06-21

## 2023-11-28 RX ADMIN — IOPAMIDOL 90 ML: 755 INJECTION, SOLUTION INTRAVENOUS at 08:12

## 2023-11-28 NOTE — PROGRESS NOTES
"Assessment/Plan:    Change in bowel movements with cough.  Check CT chest abdomen pelvis.  Suggest colonoscopy and Metamucil the latter 1 heaping tablespoon daily.  Loose stools are described with mucus in the stools.  Needs colonoscopy with colorectal surgery group at [phone].  Dr. Garcia or Dr. CURRY or associate presiding.    Consider.  Irritable bowel syndrome.  Metamucil may be beneficial.    History of prostate cancer no sign of recurrence.    Hypertension controlled stable 122/80.    Hyperlipidemia on statin therapy well-tolerated no history of MI or stroke.    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   [facility].    25 minutes spent on the date of the encounter doing chart review, patient visit, documentation, and discussion with family     Subjective:  Emiliano Talley Jr. is a 83 year old male presents for the following health issues suggest phone visit 1 month.  For follow-up.  Ultimately may require gastroenterology consult at [facility] [phone].    Loose stools mucus described by patient in the stools his wife is at his side very supportive.  1 daughter is a cardiologist at the [facility] [facility] [employer] [facility] in [facility].  There has been a 5 pound weight loss.  2 months ago he retired from his practice of [employer].  He has a PhD in natural science as well.  Recent emergency room or urgency care visit showed an x-ray of his abdomen to be clear according to the patient's wife as well as a plain x-ray of the abdomen showed no sign of obstruction or free air all clear.  The patient describes his bowel movement today as being normal.    ROS:  No blood in the stool or urine med list reviewed reconciled in the chart.    Objective:  /80 (BP Location: Right arm, Patient Position: Sitting, Cuff Size: Adult Regular)   Pulse 77   Temp 97.8  F (36.6  C) (Oral)   Resp 14   Ht 1.93 m (6' 4\")   Wt 100 kg (220 lb 6.4 oz)   SpO2 100%   BMI 26.83 kg/m    Chest is clear to " auscultation distant heart tones non-smoker distant lung sounds no rales rhonchi or wheezes belly soft nontender no masses no liver spleen tip he appeared well nonicteric skin was warm and dry he does appear slightly thinner than previous his BMI is 27 extremities are free of edema cyanosis or clubbing he does report some edema in the left lower extremity especially in the morning neck veins are nondistended there are no carotid bruits he appears younger than his stated age.  83.  Wife is at his side very supportive.    Suggested follow-up phone visit 1 month's time to review and discuss ultimately may require formal GI consult at [facility] [phone].    Casey Ann MD  Internal Medicine

## 2023-11-29 DIAGNOSIS — N28.89 RENAL MASS: Primary | ICD-10-CM

## 2023-11-29 LAB — RADIOLOGIST FLAGS: ABNORMAL

## 2023-12-04 DIAGNOSIS — N28.89 RENAL MASS: Primary | ICD-10-CM

## 2023-12-06 ENCOUNTER — TELEPHONE (OUTPATIENT)
Dept: INTERNAL MEDICINE | Facility: CLINIC | Age: 83
End: 2023-12-06
Payer: MEDICARE

## 2023-12-06 NOTE — TELEPHONE ENCOUNTER
Called patient to relay message from PCP. Patient advised that they were already called by the provider on Monday 12/4/23.    Thank you   JASWANT Sellers

## 2023-12-19 ENCOUNTER — TELEPHONE (OUTPATIENT)
Dept: INTERNAL MEDICINE | Facility: CLINIC | Age: 83
End: 2023-12-19
Payer: MEDICARE

## 2023-12-19 NOTE — TELEPHONE ENCOUNTER
Reason for Call:  Appointment Request    Patient requesting this type of appt: Pre-op    Requested provider: Casey Ann    Reason patient unable to be scheduled: Not within requested timeframe    When does patient want to be seen/preferred time:  ASAP before surgery 1/10    Comments: PT has surgery 1/10 removing growth on kidney at [facility] with Dr. Prince. Seth soonest appt is 1/16 so scheduled pt at another location 12/29 but pt would rather be squeezed into Seth's schedule if possible since he is PCP and knows pt history    Okay to leave a detailed message?: Yes at Cell number on file:    Telephone Information:   Mobile [phone]       Call taken on 12/19/2023 at 3:19 PM by Radha Leigh

## 2023-12-20 NOTE — TELEPHONE ENCOUNTER
Left detailed message for patient. Advised that we cannot squeeze him in to Dr. Small schedule due to being booked and with the holidays. Advised to keep appt on 12.29.23

## 2023-12-21 ENCOUNTER — TRANSFERRED RECORDS (OUTPATIENT)
Dept: HEALTH INFORMATION MANAGEMENT | Facility: CLINIC | Age: 83
End: 2023-12-21
Payer: MEDICARE

## 2024-01-03 DIAGNOSIS — E78.00 HYPERCHOLESTEREMIA: ICD-10-CM

## 2024-01-03 RX ORDER — ROSUVASTATIN CALCIUM 5 MG/1
5 TABLET, COATED ORAL DAILY
Qty: 90 TABLET | Refills: 0 | Status: SHIPPED | OUTPATIENT
Start: 2024-01-03 | End: 2024-03-11

## 2024-01-04 ENCOUNTER — OFFICE VISIT (OUTPATIENT)
Dept: FAMILY MEDICINE | Facility: CLINIC | Age: 84
End: 2024-01-04
Payer: MEDICARE

## 2024-01-04 VITALS
RESPIRATION RATE: 20 BRPM | BODY MASS INDEX: 25.35 KG/M2 | OXYGEN SATURATION: 96 % | SYSTOLIC BLOOD PRESSURE: 124 MMHG | DIASTOLIC BLOOD PRESSURE: 62 MMHG | TEMPERATURE: 97.6 F | HEIGHT: 76 IN | HEART RATE: 83 BPM | WEIGHT: 208.2 LBS

## 2024-01-04 DIAGNOSIS — I44.7 LEFT BUNDLE BRANCH BLOCK: ICD-10-CM

## 2024-01-04 DIAGNOSIS — N18.31 CHRONIC KIDNEY DISEASE, STAGE 3A (H): ICD-10-CM

## 2024-01-04 DIAGNOSIS — N28.89 RENAL MASS: ICD-10-CM

## 2024-01-04 DIAGNOSIS — J44.9 CHRONIC OBSTRUCTIVE PULMONARY DISEASE, UNSPECIFIED COPD TYPE (H): ICD-10-CM

## 2024-01-04 DIAGNOSIS — Z01.818 PRE-OP EXAM: Primary | ICD-10-CM

## 2024-01-04 LAB
ERYTHROCYTE [DISTWIDTH] IN BLOOD BY AUTOMATED COUNT: 11.8 % (ref 10–15)
HCT VFR BLD AUTO: 44.5 % (ref 40–53)
HGB BLD-MCNC: 14.4 G/DL (ref 13.3–17.7)
MCH RBC QN AUTO: 31.2 PG (ref 26.5–33)
MCHC RBC AUTO-ENTMCNC: 32.4 G/DL (ref 31.5–36.5)
MCV RBC AUTO: 97 FL (ref 78–100)
PLATELET # BLD AUTO: 227 10E3/UL (ref 150–450)
RBC # BLD AUTO: 4.61 10E6/UL (ref 4.4–5.9)
WBC # BLD AUTO: 10.3 10E3/UL (ref 4–11)

## 2024-01-04 PROCEDURE — 93000 ELECTROCARDIOGRAM COMPLETE: CPT | Performed by: NURSE PRACTITIONER

## 2024-01-04 PROCEDURE — 36415 COLL VENOUS BLD VENIPUNCTURE: CPT | Performed by: NURSE PRACTITIONER

## 2024-01-04 PROCEDURE — 99214 OFFICE O/P EST MOD 30 MIN: CPT | Performed by: NURSE PRACTITIONER

## 2024-01-04 PROCEDURE — 80048 BASIC METABOLIC PNL TOTAL CA: CPT | Performed by: NURSE PRACTITIONER

## 2024-01-04 PROCEDURE — 85027 COMPLETE CBC AUTOMATED: CPT | Performed by: NURSE PRACTITIONER

## 2024-01-04 RX ORDER — RESPIRATORY SYNCYTIAL VIRUS VACCINE 120MCG/0.5
0.5 KIT INTRAMUSCULAR ONCE
Qty: 1 EACH | Refills: 0 | Status: CANCELLED | OUTPATIENT
Start: 2024-01-04 | End: 2024-01-04

## 2024-01-04 ASSESSMENT — PAIN SCALES - GENERAL: PAINLEVEL: NO PAIN (0)

## 2024-01-04 NOTE — PROGRESS NOTES
[address] [address]  Phone: [phone]  Primary Provider: Casey Ann  Pre-op Performing Provider: EFREN LYNN      PREOPERATIVE EVALUATION:  Today's date: 1/4/2024    Emiliano is a 83 year old, presenting for the following:  Pre-Op Exam ([facility] 1/18/2024)        1/4/2024     9:40 AM   Additional Questions   Roomed by Mary CARDENAS       Surgical Information:  Surgery/Procedure: RIGHT ROBOTIC ASSISTED PARTIAL NEPHRECTOMY WITH INTRAOPERATIVE ULTRASOUND   Surgery Location: [facility]   Surgeon: Casey Prince MD  Surgery Date: 1/18/2024  Time of Surgery: 2:59pm   Where patient plans to recover: Other: a night stay at [facility]   Fax number for surgical facility: [phone]    Assessment & Plan     The proposed surgical procedure is considered INTERMEDIATE risk.    Pre-op exam    - EKG 12-lead complete w/read - Clinics  - CBC with platelets; Future  - Basic metabolic panel  (Ca, Cl, CO2, Creat, Gluc, K, Na, BUN); Future  - CBC with platelets  - Basic metabolic panel  (Ca, Cl, CO2, Creat, Gluc, K, Na, BUN)    Renal mass  Reason for surgery    Chronic kidney disease, stage 3a (H)  Known issue that I take into account for their medical decisions, no current exacerbations or new concerns.     Chronic obstructive pulmonary disease, unspecified COPD type (H)  Known issue that I take into account for their medical decisions, no current exacerbations or new concerns.   Recommend taking the Flovent as prescribed.    Left bundle branch block  Chronic; no chest pain or shortness of breath.           Risks and Recommendations:  The patient has the following additional risks and recommendations for perioperative complications:   - No identified additional risk factors other than previously addressed    Antiplatelet or Anticoagulation Medication Instructions:   - aspirin: Discontinue aspirin 7-10 days prior to procedure to reduce bleeding risk. It should  be resumed postoperatively.     Additional Medication Instructions:   - Statins: Continue taking on the day of surgery.    - LABA, inhaled corticosteroid, long-acting anticholinergics: Continue without modification.   - rescue Inhaler: Continue PRN. Bring to hospital on the day of surgery.    RECOMMENDATION:  APPROVAL GIVEN to proceed with proposed procedure, without further diagnostic evaluation.            Subjective       HPI related to upcoming procedure: Patient presents for preop evaluation in anticipation for above procedure.         1/4/2024     9:20 AM   Preop Questions   1. Have you ever had a heart attack or stroke? No   2. Have you ever had surgery on your heart or blood vessels, such as a stent placement, a coronary artery bypass, or surgery on an artery in your head, neck, heart, or legs? No   3. Do you have chest pain with activity? No   4. Do you have a history of  heart failure? No   5. Do you currently have a cold, bronchitis or symptoms of other infection? No   6. Do you have a cough, shortness of breath, or wheezing? UNKNOWN - a cough at times    7. Do you or anyone in your family have previous history of blood clots? No   8. Do you or does anyone in your family have a serious bleeding problem such as prolonged bleeding following surgeries or cuts? No   9. Have you ever had problems with anemia or been told to take iron pills? No   10. Have you had any abnormal blood loss such as black, tarry or bloody stools? No   11. Have you ever had a blood transfusion? No   12. Are you willing to have a blood transfusion if it is medically needed before, during, or after your surgery? Yes   13. Have you or any of your relatives ever had problems with anesthesia? No   14. Do you have sleep apnea, excessive snoring or daytime drowsiness? No   15. Do you have any artifical heart valves or other implanted medical devices like a pacemaker, defibrillator, or continuous glucose monitor? No   16. Do you have  artificial joints? No   17. Are you allergic to latex? No       Health Care Directive:  Patient does not have a Health Care Directive or Living Will: not on file    Preoperative Review of :   reviewed - no record of controlled substances prescribed.      Status of Chronic Conditions:  See problem list for active medical problems.  Problems all longstanding and stable, except as noted/documented.  See ROS for pertinent symptoms related to these conditions.    Review of Systems  Constitutional, neuro, ENT, endocrine, pulmonary, cardiac, gastrointestinal, genitourinary, musculoskeletal, integument and psychiatric systems are negative, except as otherwise noted.    Patient Active Problem List    Diagnosis Date Noted    Thyroid nodule - on the right, repeat imaging annually x 5 years ( till 2027) 12/11/2022     Priority: Medium    GERD (gastroesophageal reflux disease) 10/24/2022     Priority: Medium    Constipation 10/24/2022     Priority: Medium    Dark stools 10/24/2022     Priority: Medium    Hyperlipidemia 10/24/2022     Priority: Medium    Chronic kidney disease, stage 3a (H) 02/17/2022     Priority: Medium    Blevins's esophagus 12/02/2019     Priority: Medium    Diaphragmatic hernia 11/27/2019     Priority: Medium    Abnormal feces 11/11/2019     Priority: Medium    Diverticular disease of large intestine 07/25/2019     Priority: Medium    Hemorrhoids 07/25/2019     Priority: Medium    Polyp of colon 07/25/2019     Priority: Medium    Adenocarcinoma Of The Prostate Gland      Priority: Medium     Created by Conversion        Left Bundle Branch Block      Priority: Medium     Created by Conversion          Past Medical History:   Diagnosis Date    Arthritis     Heart disease     LBBB (left bundle branch block)     Thyroid nodule - on the right, repeat imaging annually x 5 years ( till 2027) 12/11/2022     Past Surgical History:   Procedure Laterality Date    ENT SURGERY      ESOPHAGOSCOPY, GASTROSCOPY,  "DUODENOSCOPY (EGD), COMBINED N/A 12/22/2022    Procedure: ESOPHAGOGASTRODUODENOSCOPY WITH BIOPSIES;  Surgeon: Robert Natarajan MD;  Location: [facility] Main OR    HERNIA REPAIR      PROSTATE SURGERY       Current Outpatient Medications   Medication Sig Dispense Refill    albuterol (PROAIR HFA/PROVENTIL HFA/VENTOLIN HFA) 108 (90 Base) MCG/ACT inhaler Inhale 2 puffs into the lungs every 4 hours as needed for shortness of breath, wheezing or cough 18 g 11    aspirin (MADISYN ASPIRIN EC LOW DOSE) 81 MG EC tablet take 1 tablet by oral route as needed      fluticasone (FLOVENT HFA) 110 MCG/ACT inhaler Inhale 1 puff into the lungs 2 times daily 12 g 11    lactulose (CHRONULAC) 10 GM/15ML solution Take 10 g by mouth      omeprazole (PRILOSEC) 40 MG DR capsule Take 1 capsule (40 mg) by mouth daily 90 capsule 11    rosuvastatin (CRESTOR) 5 MG tablet TAKE 1 TABLET DAILY 90 tablet 0       No Known Allergies     Social History     Tobacco Use    Smoking status: Never    Smokeless tobacco: Never   Substance Use Topics    Alcohol use: No     Family History   Problem Relation Age of Onset    Cancer Father      History   Drug Use No         Objective     /62 (BP Location: Right arm, Patient Position: Sitting, Cuff Size: Adult Large)   Pulse 83   Temp 97.6  F (36.4  C) (Oral)   Resp 20   Ht 1.93 m (6' 4\")   Wt 94.4 kg (208 lb 3.2 oz)   SpO2 96%   BMI 25.34 kg/m      Physical Exam    GENERAL APPEARANCE: healthy, alert and no distress     EYES: EOMI,  PERRL     HENT: ear canals and TM's normal and nose and mouth without ulcers or lesions     NECK: no adenopathy, no asymmetry, masses, or scars and thyroid normal to palpation     RESP: lungs clear to auscultation - no rales, rhonchi or wheezes     CV: regular rates and rhythm, normal S1 S2, no S3 or S4 and no murmur, click or rub     ABDOMEN:  soft, nontender, no HSM or masses and bowel sounds normal     SKIN: no suspicious lesions or rashes     NEURO: Normal strength and " tone, sensory exam grossly normal, mentation intact and speech normal     PSYCH: mentation appears normal. and affect normal/bright     LYMPHATICS: No cervical adenopathy    Recent Labs   Lab Test 11/28/23  0806 02/20/23  1036 12/19/22  1109 12/02/22  1510   HGB  --  14.1  --  14.7   PLT  --  185  --  184   NA  --  140 138 143   POTASSIUM  --  4.3 4.4 4.6   CR 1.4* 1.32* 1.18* 1.24*        Diagnostics:  Recent Results (from the past 168 hour(s))   CBC with platelets    Collection Time: 01/04/24 10:55 AM   Result Value Ref Range    WBC Count 10.3 4.0 - 11.0 10e3/uL    RBC Count 4.61 4.40 - 5.90 10e6/uL    Hemoglobin 14.4 13.3 - 17.7 g/dL    Hematocrit 44.5 40.0 - 53.0 %    MCV 97 78 - 100 fL    MCH 31.2 26.5 - 33.0 pg    MCHC 32.4 31.5 - 36.5 g/dL    RDW 11.8 10.0 - 15.0 %    Platelet Count 227 150 - 450 10e3/uL   Basic metabolic panel  (Ca, Cl, CO2, Creat, Gluc, K, Na, BUN)    Collection Time: 01/04/24 10:55 AM   Result Value Ref Range    Sodium 141 135 - 145 mmol/L    Potassium 4.5 3.4 - 5.3 mmol/L    Chloride 105 98 - 107 mmol/L    Carbon Dioxide (CO2) 26 22 - 29 mmol/L    Anion Gap 10 7 - 15 mmol/L    Urea Nitrogen 26.0 (H) 8.0 - 23.0 mg/dL    Creatinine 1.32 (H) 0.67 - 1.17 mg/dL    GFR Estimate 54 (L) >60 mL/min/1.73m2    Calcium 9.2 8.8 - 10.2 mg/dL    Glucose 107 (H) 70 - 99 mg/dL      EKG: Left bundle branch block, normal axis, normal intervals, no acute ST/T changes c/w ischemia, Left Bundle Branch Block, unchanged from previous tracings    Revised Cardiac Risk Index (RCRI):  The patient has the following serious cardiovascular risks for perioperative complications:   - No serious cardiac risks = 0 points     RCRI Interpretation: 0 points: Class I (very low risk - 0.4% complication rate)         Signed Electronically by: Jeanna King NP  Copy of this evaluation report is provided to requesting physician.

## 2024-01-04 NOTE — PATIENT INSTRUCTIONS
Pre-operation Instructions:    - Stop Aspirin and NSAIDS (Ibuprofen, Motrin, Advil, Aleve, Naproxen, Naprosyn) 10 days prior to the surgery/procedure or follow surgeon's direction.    - Stop all Vitamins and Herbal Supplements (including Vitamin E, Fish Oil, Omega 3 Fatty Acids, Ginseng, Gingko) 7 days prior to the surgery/procedure or follow surgeon's direction.    - Medications:  Continue your medications the morning of your surgery/procedure.    - If you become sick before your surgery/procedure (such as a fever, cough, congestion, or sore throat) you should call your primary care provider and surgeon.    - Unless given permission by your surgeon, do not eat or drink after midnight in the evening prior to your surgery/procedure.

## 2024-01-05 LAB
ANION GAP SERPL CALCULATED.3IONS-SCNC: 10 MMOL/L (ref 7–15)
BUN SERPL-MCNC: 26 MG/DL (ref 8–23)
CALCIUM SERPL-MCNC: 9.2 MG/DL (ref 8.8–10.2)
CHLORIDE SERPL-SCNC: 105 MMOL/L (ref 98–107)
CREAT SERPL-MCNC: 1.32 MG/DL (ref 0.67–1.17)
DEPRECATED HCO3 PLAS-SCNC: 26 MMOL/L (ref 22–29)
EGFRCR SERPLBLD CKD-EPI 2021: 54 ML/MIN/1.73M2
GLUCOSE SERPL-MCNC: 107 MG/DL (ref 70–99)
POTASSIUM SERPL-SCNC: 4.5 MMOL/L (ref 3.4–5.3)
SODIUM SERPL-SCNC: 141 MMOL/L (ref 135–145)

## 2024-01-08 ENCOUNTER — TELEPHONE (OUTPATIENT)
Dept: INTERNAL MEDICINE | Facility: CLINIC | Age: 84
End: 2024-01-08
Payer: MEDICARE

## 2024-01-08 NOTE — TELEPHONE ENCOUNTER
----- Message from Jeanna King NP sent at 1/6/2024  7:18 PM CST -----  Patient asked for a call about his lab results.  All his results are stable- no concerns and they are stable for upcoming surgery.    Jeanna King, LEEROY, APRN, CNP

## 2024-01-08 NOTE — TELEPHONE ENCOUNTER
"Attempt #1 to call patient.     RN left voicemail and requested return call to [facility] at [phone].     \"Dear Emiliano,     I am pleased to let you know that your labs look good.  Blood counts are normal.  Kidney function is reduced but stable for you.     If you have any questions or concerns please feel free to contact me at the office at [phone] or via [email or web address].     Jeanna King, DNP, APRN, CNP\"    Radha Pozo RN, BSN  [facility] [place]  "

## 2024-01-08 NOTE — TELEPHONE ENCOUNTER
Patients wife (consent on file) returning call. RN updated    Araceli Snider RN on 1/8/2024 at 10:58 AM

## 2024-01-18 ENCOUNTER — TRANSFERRED RECORDS (OUTPATIENT)
Dept: HEALTH INFORMATION MANAGEMENT | Facility: CLINIC | Age: 84
End: 2024-01-18
Payer: MEDICARE

## 2024-01-20 LAB
CREATININE (EXTERNAL): 1.13 MG/DL (ref 0.7–1.2)
GFR ESTIMATED (EXTERNAL): 64 ML/MIN/1.73M2
GLUCOSE (EXTERNAL): 103 MG/DL (ref 70–99)
POTASSIUM (EXTERNAL): 4.2 MMOL/L (ref 3.5–5.1)

## 2024-01-22 ENCOUNTER — PATIENT OUTREACH (OUTPATIENT)
Dept: CARE COORDINATION | Facility: CLINIC | Age: 84
End: 2024-01-22
Payer: MEDICARE

## 2024-02-05 ENCOUNTER — PATIENT OUTREACH (OUTPATIENT)
Dept: CARE COORDINATION | Facility: CLINIC | Age: 84
End: 2024-02-05
Payer: MEDICARE

## 2024-03-06 DIAGNOSIS — I10 ESSENTIAL HYPERTENSION: ICD-10-CM

## 2024-03-06 RX ORDER — OMEPRAZOLE 40 MG/1
40 CAPSULE, DELAYED RELEASE ORAL DAILY
Qty: 90 CAPSULE | Refills: 3 | Status: SHIPPED | OUTPATIENT
Start: 2024-03-06

## 2024-03-11 DIAGNOSIS — E78.00 HYPERCHOLESTEREMIA: ICD-10-CM

## 2024-03-11 RX ORDER — ROSUVASTATIN CALCIUM 5 MG/1
TABLET, COATED ORAL
Qty: 90 TABLET | Refills: 3 | Status: SHIPPED | OUTPATIENT
Start: 2024-03-11

## 2024-03-27 NOTE — LETTER
[facility]  Progress Note  Name: Marco Birmingham I  MRN: [id number]  [facility])  [facility])  Letter by Casey Ann MD at      Author: Casey Ann MD Service: -- Author Type: --    Filed:  Encounter Date: 2/16/2021 Status: (Other)         Emiliano Talley Jr.  [address]  [place] [address]             February 16, 2021         Dear Mr. Talley,    Below are the results from your recent visit:    Resulted Orders   HM2(CBC w/o Differential)   Result Value Ref Range    WBC 7.2 4.0 - 11.0 thou/uL    RBC 4.58 4.40 - 6.20 mill/uL    Hemoglobin 14.7 14.0 - 18.0 g/dL    Hematocrit 43.0 40.0 - 54.0 %    MCV 94 80 - 100 fL    MCH 32.1 27.0 - 34.0 pg    MCHC 34.2 32.0 - 36.0 g/dL    RDW 11.8 11.0 - 14.5 %    Platelets 203 140 - 440 thou/uL    MPV 10.1 (H) 7.0 - 10.0 fL   Comprehensive Metabolic Panel   Result Value Ref Range    Sodium 138 136 - 145 mmol/L    Potassium 4.4 3.5 - 5.0 mmol/L    Chloride 104 98 - 107 mmol/L    CO2 26 22 - 31 mmol/L    Anion Gap, Calculation 8 5 - 18 mmol/L    Glucose 87 70 - 125 mg/dL    BUN 27 8 - 28 mg/dL    Creatinine 1.18 0.70 - 1.30 mg/dL    GFR MDRD Af Amer >60 >60 mL/min/1.73m2    GFR MDRD Non Af Amer 59 (L) >60 mL/min/1.73m2    Bilirubin, Total 1.1 (H) 0.0 - 1.0 mg/dL    Calcium 8.7 8.5 - 10.5 mg/dL    Protein, Total 6.7 6.0 - 8.0 g/dL    Albumin 4.0 3.5 - 5.0 g/dL    Alkaline Phosphatase 56 45 - 120 U/L    AST 19 0 - 40 U/L    ALT 12 0 - 45 U/L    Narrative    Fasting Glucose reference range is 70-99 mg/dL per  American Diabetes Association (ADA) guidelines.   Lipid Cascade   Result Value Ref Range    Cholesterol 152 <=199 mg/dL    Triglycerides 64 <=149 mg/dL    HDL Cholesterol 50 >=40 mg/dL    LDL Calculated 89 <=129 mg/dL    Patient Fasting > 8hrs? Yes    Thyroid Stimulating Hormone (TSH)   Result Value Ref Range    TSH 2.09 0.30 - 5.00 uIU/mL   Urinalysis-UC if Indicated   Result Value Ref Range    Color, UA Yellow Colorless, Yellow, Straw, Light Yellow    Clarity, UA Clear Clear    Glucose, UA Negative Negative    Bilirubin, UA Small (!) Negative     Ketones, UA Negative Negative    Specific Gravity, UA 1.025 1.005 - 1.030    Blood, UA Negative Negative    pH, UA 5.5 5.0 - 8.0    Protein, UA Negative Negative mg/dL    Urobilinogen, UA 0.2 E.U./dL 0.2 E.U./dL, 1.0 E.U./dL    Nitrite, UA Negative Negative    Leukocytes, UA Negative Negative    Narrative    Microscopic not indicated  UC not indicated   PSA (Prostatic-Specific Antigen), Annual Screen   Result Value Ref Range    PSA <0.1 0.0 - 6.5 ng/mL    Narrative    Method is Abbott Prostate-Specific Antigen (PSA)  Standard-WHO 1st International (90:10)       All very good results no sign of prostate cancer recurrence.    Please call with questions or contact us using Medical Referral Sourcet.    Sincerely,        Electronically signed by Casey Ann MD        [facility]    

## 2024-04-22 ENCOUNTER — TRANSFERRED RECORDS (OUTPATIENT)
Dept: HEALTH INFORMATION MANAGEMENT | Facility: CLINIC | Age: 84
End: 2024-04-22
Payer: MEDICARE

## 2024-05-05 ENCOUNTER — HEALTH MAINTENANCE LETTER (OUTPATIENT)
Age: 84
End: 2024-05-05

## 2024-05-14 ENCOUNTER — TRANSFERRED RECORDS (OUTPATIENT)
Dept: HEALTH INFORMATION MANAGEMENT | Facility: CLINIC | Age: 84
End: 2024-05-14
Payer: MEDICARE

## 2024-06-19 ENCOUNTER — MEDICAL CORRESPONDENCE (OUTPATIENT)
Dept: HEALTH INFORMATION MANAGEMENT | Facility: CLINIC | Age: 84
End: 2024-06-19
Payer: MEDICARE

## 2024-06-19 ENCOUNTER — TELEPHONE (OUTPATIENT)
Dept: INTERNAL MEDICINE | Facility: CLINIC | Age: 84
End: 2024-06-19
Payer: MEDICARE

## 2024-06-19 NOTE — TELEPHONE ENCOUNTER
Order/Referral Request    Who is requesting: [facility]     Orders being requested: PT 2 times a wk for 3 wks   1 time a wk for 3 wks     Reason service is needed/diagnosis:  tranfer training and gate training     When are orders needed by: ASAP     Has this been discussed with Provider: No    Does patient have a preference on a Group/Provider/Facility?  [facility]     Does patient have an appointment scheduled?: No    Where to send orders:  [facility]    Could we send this information to you in [facility] [place] or would you prefer to receive a phone call?:   No preference   Okay to leave a detailed message?: Yes at Other phone number:  [phone]

## 2024-06-21 ENCOUNTER — MEDICAL CORRESPONDENCE (OUTPATIENT)
Dept: HEALTH INFORMATION MANAGEMENT | Facility: CLINIC | Age: 84
End: 2024-06-21
Payer: MEDICARE

## 2024-08-15 ENCOUNTER — TRANSFERRED RECORDS (OUTPATIENT)
Dept: HEALTH INFORMATION MANAGEMENT | Facility: CLINIC | Age: 84
End: 2024-08-15
Payer: MEDICARE

## 2024-08-18 ENCOUNTER — PATIENT OUTREACH (OUTPATIENT)
Dept: CARE COORDINATION | Facility: CLINIC | Age: 84
End: 2024-08-18
Payer: MEDICARE

## 2024-11-11 ENCOUNTER — OFFICE VISIT (OUTPATIENT)
Dept: INTERNAL MEDICINE | Facility: CLINIC | Age: 84
End: 2024-11-11
Payer: MEDICARE

## 2024-11-11 VITALS
WEIGHT: 213.7 LBS | TEMPERATURE: 98.1 F | BODY MASS INDEX: 26.02 KG/M2 | HEIGHT: 76 IN | SYSTOLIC BLOOD PRESSURE: 130 MMHG | RESPIRATION RATE: 14 BRPM | DIASTOLIC BLOOD PRESSURE: 62 MMHG | OXYGEN SATURATION: 97 % | HEART RATE: 77 BPM

## 2024-11-11 DIAGNOSIS — E78.2 MIXED HYPERLIPIDEMIA: ICD-10-CM

## 2024-11-11 DIAGNOSIS — C64.1 HYPERNEPHROMA OF RIGHT KIDNEY (H): Primary | ICD-10-CM

## 2024-11-11 DIAGNOSIS — S72.001S CLOSED FRACTURE OF RIGHT HIP, SEQUELA: ICD-10-CM

## 2024-11-11 LAB
ALBUMIN SERPL BCG-MCNC: 3.8 G/DL (ref 3.5–5.2)
ALP SERPL-CCNC: 55 U/L (ref 40–150)
ALT SERPL W P-5'-P-CCNC: 7 U/L (ref 0–70)
ANION GAP SERPL CALCULATED.3IONS-SCNC: 8 MMOL/L (ref 7–15)
AST SERPL W P-5'-P-CCNC: 22 U/L (ref 0–45)
BILIRUB SERPL-MCNC: 0.9 MG/DL
BUN SERPL-MCNC: 30.2 MG/DL (ref 8–23)
CALCIUM SERPL-MCNC: 9.2 MG/DL (ref 8.8–10.4)
CHLORIDE SERPL-SCNC: 107 MMOL/L (ref 98–107)
CHOLEST SERPL-MCNC: 133 MG/DL
CREAT SERPL-MCNC: 1.39 MG/DL (ref 0.67–1.17)
EGFRCR SERPLBLD CKD-EPI 2021: 50 ML/MIN/1.73M2
ERYTHROCYTE [DISTWIDTH] IN BLOOD BY AUTOMATED COUNT: 12.4 % (ref 10–15)
FASTING STATUS PATIENT QL REPORTED: ABNORMAL
FASTING STATUS PATIENT QL REPORTED: NORMAL
GLUCOSE SERPL-MCNC: 98 MG/DL (ref 70–99)
HCO3 SERPL-SCNC: 24 MMOL/L (ref 22–29)
HCT VFR BLD AUTO: 40 % (ref 40–53)
HDLC SERPL-MCNC: 58 MG/DL
HGB BLD-MCNC: 13.1 G/DL (ref 13.3–17.7)
LDLC SERPL CALC-MCNC: 62 MG/DL
MCH RBC QN AUTO: 31.3 PG (ref 26.5–33)
MCHC RBC AUTO-ENTMCNC: 32.8 G/DL (ref 31.5–36.5)
MCV RBC AUTO: 96 FL (ref 78–100)
NONHDLC SERPL-MCNC: 75 MG/DL
PLATELET # BLD AUTO: 177 10E3/UL (ref 150–450)
POTASSIUM SERPL-SCNC: 4.5 MMOL/L (ref 3.4–5.3)
PROT SERPL-MCNC: 6.6 G/DL (ref 6.4–8.3)
RBC # BLD AUTO: 4.18 10E6/UL (ref 4.4–5.9)
SODIUM SERPL-SCNC: 139 MMOL/L (ref 135–145)
TRIGL SERPL-MCNC: 65 MG/DL
WBC # BLD AUTO: 8.3 10E3/UL (ref 4–11)

## 2024-11-11 PROCEDURE — 80061 LIPID PANEL: CPT | Performed by: INTERNAL MEDICINE

## 2024-11-11 PROCEDURE — 85027 COMPLETE CBC AUTOMATED: CPT | Performed by: INTERNAL MEDICINE

## 2024-11-11 PROCEDURE — 99214 OFFICE O/P EST MOD 30 MIN: CPT | Performed by: INTERNAL MEDICINE

## 2024-11-11 PROCEDURE — 80053 COMPREHEN METABOLIC PANEL: CPT | Performed by: INTERNAL MEDICINE

## 2024-11-11 PROCEDURE — G2211 COMPLEX E/M VISIT ADD ON: HCPCS | Performed by: INTERNAL MEDICINE

## 2024-11-11 PROCEDURE — 36415 COLL VENOUS BLD VENIPUNCTURE: CPT | Performed by: INTERNAL MEDICINE

## 2024-11-11 NOTE — PROGRESS NOTES
"  {PROVIDER CHARTING PREFERENCE:641105}    Subjective   Emiliano is a 84 year old, presenting for the following health issues:  No chief complaint on file.      11/11/2024     9:56 AM   Additional Questions   Roomed by Siobhan   Accompanied by wife     HPI     {MA/LPN/RN Pre-Provider Visit Orders- hCG/UA/Strep (Optional):293471}  {SUPERLIST (Optional):183497}  {additonal problems for provider to add (Optional):691254}    {ROS Picklists (Optional):724202}      Objective    /62   Pulse 77   Temp 98.1  F (36.7  C) (Oral)   Resp 14   Ht 1.924 m (6' 3.75\")   Wt 96.9 kg (213 lb 11.2 oz)   SpO2 97%   BMI 26.18 kg/m    Body mass index is 26.18 kg/m .  Physical Exam   {Exam List (Optional):308835}    {Diagnostic Test Results (Optional):312849}        Signed Electronically by: Casey Ann MD  {Email feedback regarding this note to [email or web address]   :377220}  "

## 2024-11-11 NOTE — PROGRESS NOTES
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 Prince from [place] [facility].  Robotic surgery January 14, 2024.  No sign of recurrence close follow-up with Dr. Prince at [facility] encouraged.  Plan: CBC with platelets, Comprehensive metabolic         panel        Encourage close follow-up with Dr. [facility] at [facility].  Periodic examination subsequent to initial surgery at 3 months 6 months if showed no sign of recurrence.    (E78.2) Mixed hyperlipidemia  Comment: No history of target organ damage related to same.  Currently on low-dose rosuvastatin 5 mg daily.  Plan: Lipid panel reflex to direct LDL Fasting        Continue low-dose rosuvastatin 5 mg daily.    (S72.001S) Closed fracture of right hip, sequela  Comment: Status post right partial hip replacement.  Followed by orthopedic.  Doing well in this regard accompanied by his wife uses a walker.  No recent falls.  Fell after mowing his grass.  In all [facility].  Writing:.  Plan: Continue close follow-up with orthopedic people and physical therapy for rehabilitation and strengthening.        25 minutes spent on the date of the encounter doing chart review, review of test results, interpretation of tests, patient visit, and discussion with family     Subjective:  Emiliano HENDERSON Mayank Griffith. is a 84 year old male presents for the following health issues initially the kidney cancer found on the right side was after CT scan was ordered by this examiner for evaluation of weight loss.  Specifically CT chest abdomen and pelvis showed a right sided kidney cancer resected.  Hypernephroma.  Partial nephrectomy right side.  No evidence for local recurrence not bilateral disease at this juncture.  Followed by Dr. Prince from [facility].  Subsequent to this the patient fell after his riding lawn [facility]  "he had fractured a hip on the right.  Surgical intervention was done at [facility].  Also complains of his kidney cancer surgery.  Robotic.    ROS:  Blood in stool or urine med list reviewed reconciled in the chart.  We had a good discussion.  Denies chest pain shortness of breath accompanied today by his wife who is very supportive.    Objective:  /62   Pulse 77   Temp 98.1  F (36.7  C) (Oral)   Resp 14   Ht 1.924 m (6' 3.75\")   Wt 96.9 kg (213 lb 11.2 oz)   SpO2 97%   BMI 26.18 kg/m    Chest is clear heart tones regular rhythm abdomen benign extremities free of edema slightly pale in color not in acute distress nontoxic afebrile.  Vital signs are stable.  There is no neck vein distention or carotid bruits.  Semi-reTired patentattorney.  Edema.  No cyanosis or clubbing.    Casey Ann MD  Internal Medicine    The longitudinal plan of care for the diagnosis(es)/condition(s) as documented were addressed during this visit. Due to the added complexity in care, I will continue to support Emiliano in the subsequent management and with ongoing continuity of care.      "

## 2025-01-06 ENCOUNTER — OFFICE VISIT (OUTPATIENT)
Dept: INTERNAL MEDICINE | Facility: CLINIC | Age: 85
End: 2025-01-06
Payer: MEDICARE

## 2025-01-06 VITALS
SYSTOLIC BLOOD PRESSURE: 128 MMHG | HEIGHT: 74 IN | DIASTOLIC BLOOD PRESSURE: 70 MMHG | WEIGHT: 208.5 LBS | TEMPERATURE: 97.4 F | HEART RATE: 62 BPM | BODY MASS INDEX: 26.76 KG/M2 | OXYGEN SATURATION: 97 % | RESPIRATION RATE: 16 BRPM

## 2025-01-06 DIAGNOSIS — Z12.5 SCREENING FOR PROSTATE CANCER: ICD-10-CM

## 2025-01-06 DIAGNOSIS — Z00.00 ROUTINE GENERAL MEDICAL EXAMINATION AT A HEALTH CARE FACILITY: Primary | ICD-10-CM

## 2025-01-06 DIAGNOSIS — N18.32 CHRONIC KIDNEY DISEASE, STAGE 3B (H): ICD-10-CM

## 2025-01-06 DIAGNOSIS — C64.9 RENAL CELL CARCINOMA, UNSPECIFIED LATERALITY (H): ICD-10-CM

## 2025-01-06 LAB
ALBUMIN UR-MCNC: ABNORMAL MG/DL
APPEARANCE UR: CLEAR
BACTERIA #/AREA URNS HPF: ABNORMAL /HPF
BILIRUB UR QL STRIP: NEGATIVE
COLOR UR AUTO: YELLOW
ERYTHROCYTE [DISTWIDTH] IN BLOOD BY AUTOMATED COUNT: 12.5 % (ref 10–15)
GLUCOSE UR STRIP-MCNC: NEGATIVE MG/DL
HCT VFR BLD AUTO: 41 % (ref 40–53)
HGB BLD-MCNC: 13.5 G/DL (ref 13.3–17.7)
HGB UR QL STRIP: NEGATIVE
KETONES UR STRIP-MCNC: ABNORMAL MG/DL
LEUKOCYTE ESTERASE UR QL STRIP: NEGATIVE
MCH RBC QN AUTO: 32 PG (ref 26.5–33)
MCHC RBC AUTO-ENTMCNC: 32.9 G/DL (ref 31.5–36.5)
MCV RBC AUTO: 97 FL (ref 78–100)
NITRATE UR QL: NEGATIVE
PH UR STRIP: 5.5 [PH] (ref 5–8)
PLATELET # BLD AUTO: 168 10E3/UL (ref 150–450)
RBC # BLD AUTO: 4.22 10E6/UL (ref 4.4–5.9)
RBC #/AREA URNS AUTO: ABNORMAL /HPF
SP GR UR STRIP: >=1.03 (ref 1–1.03)
SQUAMOUS #/AREA URNS AUTO: ABNORMAL /LPF
UROBILINOGEN UR STRIP-ACNC: 0.2 E.U./DL
WBC # BLD AUTO: 7.7 10E3/UL (ref 4–11)
WBC #/AREA URNS AUTO: ABNORMAL /HPF
WBC CLUMPS #/AREA URNS HPF: PRESENT /HPF

## 2025-01-06 PROCEDURE — 99214 OFFICE O/P EST MOD 30 MIN: CPT | Performed by: INTERNAL MEDICINE

## 2025-01-06 PROCEDURE — 80053 COMPREHEN METABOLIC PANEL: CPT | Performed by: INTERNAL MEDICINE

## 2025-01-06 PROCEDURE — G0103 PSA SCREENING: HCPCS | Performed by: INTERNAL MEDICINE

## 2025-01-06 PROCEDURE — G0439 PPPS, SUBSEQ VISIT: HCPCS | Mod: 4MD | Performed by: INTERNAL MEDICINE

## 2025-01-06 PROCEDURE — 81001 URINALYSIS AUTO W/SCOPE: CPT | Performed by: INTERNAL MEDICINE

## 2025-01-06 PROCEDURE — 84443 ASSAY THYROID STIM HORMONE: CPT | Performed by: INTERNAL MEDICINE

## 2025-01-06 PROCEDURE — 36415 COLL VENOUS BLD VENIPUNCTURE: CPT | Performed by: INTERNAL MEDICINE

## 2025-01-06 PROCEDURE — 80061 LIPID PANEL: CPT | Performed by: INTERNAL MEDICINE

## 2025-01-06 PROCEDURE — 85027 COMPLETE CBC AUTOMATED: CPT | Performed by: INTERNAL MEDICINE

## 2025-01-06 SDOH — HEALTH STABILITY: PHYSICAL HEALTH: ON AVERAGE, HOW MANY DAYS PER WEEK DO YOU ENGAGE IN MODERATE TO STRENUOUS EXERCISE (LIKE A BRISK WALK)?: 6 DAYS

## 2025-01-06 ASSESSMENT — PAIN SCALES - GENERAL: PAINLEVEL_OUTOF10: MILD PAIN (2)

## 2025-01-06 ASSESSMENT — SOCIAL DETERMINANTS OF HEALTH (SDOH): HOW OFTEN DO YOU GET TOGETHER WITH FRIENDS OR RELATIVES?: TWICE A WEEK

## 2025-01-06 NOTE — PROGRESS NOTES
Annual Wellness Visit:  Emiliano Talley Jr.  is a 84 year old male  who presents for an annual wellness visit.  Physician and patient sharing was accomplished today.    I do not prescribe opioids for this patient.  The patient does not receive opioids from any other provider.  The patient's provider list includes myself HELEN SAMS as his primary care general internist and PCP.  Cognitive assessment was done the patient was able to draw the face of an analog clock and remember 3 items.    Labs checked today include hemogram comprehensive metabolic profile lipid panel PSA TSH urinalysis.  His wife accompanies him here today and requested a refill of omeprazole 20 mg daily #9011 refills.  The patient's functional capacity ADLs and safety in the home was assessed okay patient uses a walker for balance.  No recent falls.  Disability parking permit request form completed.  Emotional mental health with the patient was normal.  He is a retired .  With PhD in mechanical engineering.  [employer].    Advance care planning done.    Falls risk assessment also accomplished.    Cognitive assessment was completed and the current provider this examiner and patient sharing was also completed.  Assessment/Plan:  (Z00.00) Routine general medical examination at a health care facility  (primary encounter diagnosis)  Comment: Comprehensive examination was completed today.  The patient does note some dependent edema discussed leg elevation support stockings and salt avoidance.  In the diet  Plan: CBC with platelets, Comprehensive metabolic         panel, Lipid panel reflex to direct LDL         Fasting, TSH, UA Macroscopic with reflex to         Microscopic and Culture, omeprazole (PRILOSEC)         20 MG DR capsule        General Physical examination was done results see below.    (C64.9) Renal cell carcinoma, unspecified laterality (H)  Comment: Prior history of partial nephrectomy right side one third  removed for hypernephroma no sign of recurrence.  No sign of bilateral metachronous development either  Plan: Continue close follow-up with [facility] Dr. Prince presiding.    (N18.32) Chronic kidney disease, stage 3b (H)  Comment: Emphasized the importance of staying well-hydrated avoiding nonsteroidal anti-inflammatory drugs and normalization of blood pressure.  Today 128/70.  No history of diabetes.  Plan: Close monitoring today comprehensive metabolic profile will be done.  To include serum creatinine potassium and CO2.    (Z12.5) Screening for prostate cancer  Comment: Denies bone pain prior history of prostate cancer status post open prostatectomy.  Now retired Dr. YUSUF presoh.  No sign of recurrence.  Plan: Prostate Specific Antigen Screen        See above        Subjective:   Medical History:    Non-smoker    No alcohol    No drug allergies.  Prior history of retired  with a PhD mechanical engineering as well.  [employer].  Today by his wife he is losing weight more frail has been some loss of muscle mass as well.  Denies bone pain.  Appetite plus minus.    As noted above right nephrectomy one third referral for hypernephroma.  Prostatectomy for prostate cancer.  Right total hip arthroplasty and history of hyperlipidemia.  Remote history of left bundle branch block.  Negative cardiac workup.  He has a daughter who is a cardiologist at the [facility] in [facility].  Past Medical History:   Diagnosis Date    Arthritis     Heart disease     LBBB (left bundle branch block)     Thyroid nodule - on the right, repeat imaging annually x 5 years ( till 2027) 12/11/2022     Current Outpatient Medications   Medication Sig Dispense Refill    albuterol (PROAIR HFA/PROVENTIL HFA/VENTOLIN HFA) 108 (90 Base) MCG/ACT inhaler Inhale 2 puffs into the lungs every 4 hours as needed for shortness of breath, wheezing or cough 18 g 11    aspirin (MADISYN ASPIRIN EC LOW DOSE) 81 MG  "EC tablet take 1 tablet by oral route as needed      fluticasone (FLOVENT HFA) 110 MCG/ACT inhaler Inhale 1 puff into the lungs 2 times daily 12 g 11    lactulose (CHRONULAC) 10 GM/15ML solution Take 10 g by mouth      omeprazole (PRILOSEC) 20 MG DR capsule Take 1 capsule (20 mg) by mouth daily. 90 capsule 11    rosuvastatin (CRESTOR) 5 MG tablet TAKE 1 TABLET DAILY (MAKE A LAB ONLY APPOINTMENT, NEEDED FOR FUTURE REFILL) 90 tablet 3     No current facility-administered medications for this visit.     Immunization History   Administered Date(s) Administered    COVID-19 Bivalent 12+ (Pfizer) 2022    COVID-19 MONOVALENT 12+ (Pfizer) 2011, 2021, 2021, 2021    DT (PEDS <7y) 2004    Pneumo Conj 13-V (2010&after) 2015    Pneumococcal 23 valent 2004    Pneumococcal, Unspecified 2001    TDAP (Adacel,Boostrix) 2013    Td (Adult), Adsorbed 2004    Td,adult,historic,unspecified 2004, 2013       Surgical History:  Past Surgical History:   Procedure Laterality Date    ENT SURGERY      ESOPHAGOSCOPY, GASTROSCOPY, DUODENOSCOPY (EGD), COMBINED N/A 2022    Procedure: ESOPHAGOGASTRODUODENOSCOPY WITH BIOPSIES;  Surgeon: Robert Natarajan MD;  Location: [facility] Main OR    HERNIA REPAIR      PROSTATE SURGERY          Family History:  Mother to 7 children.  All well wife is present and supportive.    Mother of the patient  89 after a fall and a hip fracture.    Father  of metastatic prostate cancer at age 84.    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    Health Maintenances:  Immunizations vaccines are reviewed and up-to-date.    Objective:  /70   Pulse 62   Temp 97.4  F (36.3  C) (Oral)   Resp 16   Ht 1.876 m (6' 1.86\")   Wt 94.6 kg (208 lb 8 oz)   SpO2 97%   BMI 26.87 kg/m    Some edema noted left lower extremity more than the right neck veins are nondistended " lungs clear heart tones regular rhythm no carotid bruits no thyromegaly or thyroid nodules he appears weak.  There is minimal loss of muscle in the subcutaneous structures of the upper torso and lower legs.  He is unable to move as quickly or as securely as previously he does use a wheeled walker no recent falls reported he declined a genital rectal exam at this time accompanied today by his wife who is very supportive.  He was not acutely ill not toxic appearing he was afebrile and his other vital signs are stable.  Pulse 60 O2 sats 97% and respiratory rate was 16 and unlabored there is no central acrocyanosis noted tachypnea.    Casey Ann MD    Internal Medicine

## 2025-01-06 NOTE — PROGRESS NOTES
Preventive Care Visit  [facility]  Casey Ann MD, Internal Medicine  Jan 6, 2025  {Provider  Link to SmartSet :416263}    {PROVIDER CHARTING PREFERENCE:362154}    Mick Cummings is a 84 year old, presenting for the following:  Annual Visit (Patient wife is concern about patient feet (left), nails on big toe and loss of appetite. /Patient needs prescription for Omeprazole. /Patient needs Disability Parking Tag. )        1/6/2025     3:29 PM   Additional Questions   Roomed by Chucho GARCIA MA   Accompanied by Wife     {ROOMER if patient is in their first year of Medicare a vision screen is required click here to document the Vison screen and then refresh the note to pull in results  :666869}      HPI  ***  {MA/LPN/RN Pre-Provider Visit Orders- hCG/UA/Strep (Optional):140497}  {SUPERLIST (Optional):557080}  {additonal problems for provider to add (Optional):465391}  Health Care Directive  Patient does not have a Health Care Directive: {ADVANCE_DIRECTIVE_STATUS:194243}      1/6/2025   General Health   How would you rate your overall physical health? Good   Feel stress (tense, anxious, or unable to sleep) Only a little         1/6/2025   Nutrition   Diet: Regular (no restrictions)         1/6/2025   Exercise   Days per week of moderate/strenous exercise 6 days         1/6/2025   Social Factors   Frequency of gathering with friends or relatives Twice a week         2/20/2023   Activities of Daily Living- Home Safety   Needs help with the following daily activites NO assistance is needed   Safety concerns in the home None of the above         1/6/2025   Dental   Dentist two times every year? Yes         2/20/2023   Hearing Screening   Hearing concerns? No concerns            {Rooming Staff Patient needs a PHQ as part of the AWV.  Use this link to complete and then refresh the note to pull results Link to PHQ2 Assessment :412156}    Today's PHQ-2 Score:       1/6/2025     3:39 PM   PHQ-2  ( 1999 Pfizer)   PHQ-2 Score Incomplete         1/6/2025   Substance Use   Alcohol more than 3/day or more than 7/wk No   Do you have a current opioid prescription? No   How severe/bad is pain from 1 to 10? 2/10   Do you use any other substances recreationally? No     Social History     Tobacco Use    Smoking status: Never    Smokeless tobacco: Never   Vaping Use    Vaping status: Never Used   Substance Use Topics    Alcohol use: No    Drug use: No     {Provider  If there are gaps in the social history shown above, please follow the link to update and then refresh the note Link to Social and Substance History :239690}    {Link to Fracture Risk Assessment Tool (Optional):708639}    {Provider  REQUIRED FOR AWV Use the storyboard to review patient history, after sections have been marked as reviewed, refresh note to capture documentation:733981}  {Provider   REQUIRED AWV use this link to review and update sexual activity history  after section has been marked as reviewed, refresh note to capture documentation:924768}  Reviewed and updated as needed this visit by Provider     Meds                {HISTORY OPTIONS (Optional):489003}  Current providers sharing in care for this patient include:  Patient Care Team:  Casey Ann MD as PCP - General (Internal Medicine)  Oscar Marx MD as MD (Neurology)  Casey Ann MD as Assigned PCP    The following health maintenance items are reviewed in Epic and correct as of today:  Health Maintenance   Topic Date Due    MICROALBUMIN  Never done    ZOSTER IMMUNIZATION (1 of 2) Never done    RSV VACCINE (1 - 1-dose 75+ series) Never done    Pneumococcal Vaccine: 50+ Years (3 of 3 - PCV20 or PCV21) 11/09/2020    ANNUAL REVIEW OF HM ORDERS  11/01/2023    DTAP/TDAP/TD IMMUNIZATION (3 - Td or Tdap) 11/04/2023    MEDICARE ANNUAL WELLNESS VISIT  02/20/2024    INFLUENZA VACCINE (1) Never done    COVID-19 Vaccine (6 - 2024-25 season) 09/01/2024    PHQ-2 (once per calendar  "year)  01/01/2025    BMP  11/11/2025    LIPID  11/11/2025    HEMOGLOBIN  11/11/2025    FALL RISK ASSESSMENT  01/06/2026    ADVANCE CARE PLANNING  02/15/2026    URINALYSIS  Completed    HPV IMMUNIZATION  Aged Out    MENINGITIS IMMUNIZATION  Aged Out    RSV MONOCLONAL ANTIBODY  Aged Out       {ROS Picklists (Optional):188077}     Objective    Exam  /70   Pulse 62   Temp 97.4  F (36.3  C) (Oral)   Resp 16   Ht 1.876 m (6' 1.86\")   Wt 94.6 kg (208 lb 8 oz)   SpO2 97%   BMI 26.87 kg/m     Estimated body mass index is 26.87 kg/m  as calculated from the following:    Height as of this encounter: 1.876 m (6' 1.86\").    Weight as of this encounter: 94.6 kg (208 lb 8 oz).    Physical Exam  {Exam Choices (Optional):329113}         1/6/2025   Mini Cog   Clock Draw Score 2 Normal   3 Item Recall 3 objects recalled   Mini Cog Total Score 5     {A Mini-Cog total score of 0-2 suggests the possibility of dementia, score of 3-5 suggests no dementia:514058}         Signed Electronically by: Casey Ann MD  {Email feedback regarding this note to [email or web address]   :408580}  "

## 2025-01-07 LAB
ALBUMIN SERPL BCG-MCNC: 4 G/DL (ref 3.5–5.2)
ALP SERPL-CCNC: 60 U/L (ref 40–150)
ALT SERPL W P-5'-P-CCNC: 8 U/L (ref 0–70)
ANION GAP SERPL CALCULATED.3IONS-SCNC: 8 MMOL/L (ref 7–15)
AST SERPL W P-5'-P-CCNC: 20 U/L (ref 0–45)
BILIRUB SERPL-MCNC: 0.9 MG/DL
BUN SERPL-MCNC: 36.1 MG/DL (ref 8–23)
CALCIUM SERPL-MCNC: 9.4 MG/DL (ref 8.8–10.4)
CHLORIDE SERPL-SCNC: 110 MMOL/L (ref 98–107)
CHOLEST SERPL-MCNC: 145 MG/DL
CREAT SERPL-MCNC: 1.35 MG/DL (ref 0.67–1.17)
EGFRCR SERPLBLD CKD-EPI 2021: 52 ML/MIN/1.73M2
FASTING STATUS PATIENT QL REPORTED: ABNORMAL
FASTING STATUS PATIENT QL REPORTED: NORMAL
GLUCOSE SERPL-MCNC: 94 MG/DL (ref 70–99)
HCO3 SERPL-SCNC: 25 MMOL/L (ref 22–29)
HDLC SERPL-MCNC: 70 MG/DL
LDLC SERPL CALC-MCNC: 64 MG/DL
NONHDLC SERPL-MCNC: 75 MG/DL
POTASSIUM SERPL-SCNC: 4.3 MMOL/L (ref 3.4–5.3)
PROT SERPL-MCNC: 7 G/DL (ref 6.4–8.3)
PSA SERPL DL<=0.01 NG/ML-MCNC: <0.01 NG/ML
SODIUM SERPL-SCNC: 143 MMOL/L (ref 135–145)
TRIGL SERPL-MCNC: 55 MG/DL
TSH SERPL DL<=0.005 MIU/L-ACNC: 2.65 UIU/ML (ref 0.3–4.2)

## 2025-02-17 ENCOUNTER — OFFICE VISIT (OUTPATIENT)
Dept: INTERNAL MEDICINE | Facility: CLINIC | Age: 85
End: 2025-02-17
Payer: MEDICARE

## 2025-02-17 VITALS
BODY MASS INDEX: 27.89 KG/M2 | HEIGHT: 74 IN | OXYGEN SATURATION: 96 % | TEMPERATURE: 97.7 F | DIASTOLIC BLOOD PRESSURE: 70 MMHG | RESPIRATION RATE: 16 BRPM | WEIGHT: 217.3 LBS | HEART RATE: 72 BPM | SYSTOLIC BLOOD PRESSURE: 136 MMHG

## 2025-02-17 DIAGNOSIS — N18.32 CHRONIC KIDNEY DISEASE, STAGE 3B (H): Primary | ICD-10-CM

## 2025-02-17 DIAGNOSIS — C61 MALIGNANT NEOPLASM OF PROSTATE (H): ICD-10-CM

## 2025-02-17 DIAGNOSIS — C64.9 RENAL CELL CARCINOMA, UNSPECIFIED LATERALITY (H): ICD-10-CM

## 2025-02-17 PROCEDURE — G2211 COMPLEX E/M VISIT ADD ON: HCPCS | Performed by: INTERNAL MEDICINE

## 2025-02-17 PROCEDURE — 99214 OFFICE O/P EST MOD 30 MIN: CPT | Performed by: INTERNAL MEDICINE

## 2025-02-17 ASSESSMENT — PAIN SCALES - GENERAL: PAINLEVEL_OUTOF10: NO PAIN (0)

## 2025-02-17 NOTE — PROGRESS NOTES
"  {PROVIDER CHARTING PREFERENCE:656440}    Subjective   Emiliano is a 84 year old, presenting for the following health issues:  Forms (Disability forms. )        2/17/2025     2:11 PM   Additional Questions   Roomed by Chucho GARCIA MA   Accompanied by Wife         2/17/2025   Forms   Any forms needing to be completed Yes     History of Present Illness       Reason for visit:  Followup    He eats 2-3 servings of fruits and vegetables daily.He exercises with enough effort to increase his heart rate 10 to 19 minutes per day.  He exercises with enough effort to increase his heart rate 4 days per week.   He is taking medications regularly.       {MA/LPN/RN Pre-Provider Visit Orders- hCG/UA/Strep (Optional):228422}  {SUPERLIST (Optional):935151}  {additonal problems for provider to add (Optional):752699}    {ROS Picklists (Optional):805078}      Objective    /70   Pulse 72   Temp 97.7  F (36.5  C) (Oral)   Resp 16   Ht 1.876 m (6' 1.86\")   Wt 98.6 kg (217 lb 4.8 oz)   SpO2 96%   BMI 28.01 kg/m    Body mass index is 28.01 kg/m .  Physical Exam   {Exam List (Optional):439639}    {Diagnostic Test Results (Optional):208400}        Signed Electronically by: Casey Ann MD  {Email feedback regarding this note to [email or web address]   :098936}  "

## 2025-02-17 NOTE — PROGRESS NOTES
"Assessment/Plan:    (N18.32) Chronic kidney disease, stage 3b (H)  (primary encounter diagnosis)  Comment: Stable and continue same meds and cares.  Plan: Medically safe to operate a motor vehicle.    (C64.9) Renal cell carcinoma, unspecified laterality (H)  Comment: No clinical evidence of recurrence of kidney cancer.  One third of the right kidney was previously resected.  Plan: Medically safe to operate a motor vehicle.    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  Plan: Medically safe to operate a motor vehicle.        25 minutes spent on the date of the encounter doing chart review, patient visit, and documentation     Subjective:  Emiliano Talley Jr. is a 84 year old male presents for the following health issues accompanied today by his wife Mrs. Rae.  This examiner previously completed a form regarding the patient's disability parking permit.  A box was erroneously checked by this examiner and this report is to correct that previously written report of January 6, 2025.    The patient is certainly capable of operating and safely operating a motor vehicle.    ROS:  No blood in stool or urine denies chest pain shortness of breath medication list reviewed reconciled in the chart generally well-tolerated.    Objective:  /70   Pulse 72   Temp 97.7  F (36.5  C) (Oral)   Resp 16   Ht 1.876 m (6' 1.86\")   Wt 98.6 kg (217 lb 4.8 oz)   SpO2 96%   BMI 28.01 kg/m    The patient is accompanied today by his wife who is very supportive.  The neck veins were nondistended there were no carotid bruits left or right his chest was clear left and right side anteriorly the heart tones revealed a regular rhythm without murmur rub or gallop the abdomen was benign there is no leg edema he appeared well.  Recovering and appears stronger than previous examinations.    The patient is medically able to operate safely a motor vehicle without any needed concern " and this was expressed to the patient and his wife who is here and very supportive.    Follow-up recommended with this examiner in 4 months time.    Casey Ann MD  Internal Medicine    The longitudinal plan of care for the diagnosis(es)/condition(s) as documented were addressed during this visit. Due to the added complexity in care, I will continue to support Emiliano in the subsequent management and with ongoing continuity of care.      Answers submitted by the patient for this visit:  General Questionnaire (Submitted on 2/17/2025)  Chief Complaint: Chronic problems general questions HPI Form  What is the reason for your visit today? : followup  How many servings of fruits and vegetables do you eat daily?: 2-3  How many minutes a day do you exercise enough to make your heart beat faster?: 10 to 19  How many days a week do you exercise enough to make your heart beat faster?: 4  How many days per week do you miss taking your medication?: 0  Questionnaire about: Chronic problems general questions HPI Form (Submitted on 2/17/2025)  Chief Complaint: Chronic problems general questions HPI Form

## 2025-05-01 DIAGNOSIS — Z00.00 ROUTINE GENERAL MEDICAL EXAMINATION AT A HEALTH CARE FACILITY: ICD-10-CM

## 2025-05-01 RX ORDER — OMEPRAZOLE 20 MG/1
20 CAPSULE, DELAYED RELEASE ORAL DAILY
Qty: 90 CAPSULE | Refills: 5 | Status: SHIPPED | OUTPATIENT
Start: 2025-05-01

## 2025-05-01 NOTE — TELEPHONE ENCOUNTER
Medication Question or Refill    Patient looking to have the 1/6/2025 prescription discontinued, than get a new prescription to be routed to [facility]     -Please don't deny and state patient already has refills     -Patient requesting to have medication sent to [facility]     They know they have 90 tabs and 11 refills on file, it went to [facility]     -They stated he is OUT and they bought some over the counter, though they had a prescription     What medication are you calling about (include dose and sig)?:    Disp Refills Start End [facility]   omeprazole (PRILOSEC) 20 MG DR capsule 90 capsule 11 1/6/2025 -- No   Sig - Route: Take 1 capsule (20 mg) by mouth daily. - Oral   Sent to pharmacy as: Omeprazole 20 MG Oral Capsule Delayed Release (PriLOSEC)   Class: E-Prescribe   Order: [id number]   E-Prescribing Status: Receipt confirmed by pharmacy (1/6/2025  4:08 PM CST)       Preferred Pharmacy:    [id number] - [facility] [address]  [address] [facility] [address]  Phone: [phone] Fax: [phone]      Controlled Substance Agreement on file:   CSA -- Patient Level:    CSA: None found at the patient level.       Who prescribed the medication?: PCP    Do you need a refill? Yes    When did you use the medication last? 1/6/2025      Could we send this information to you in [employer] or would you prefer to receive a phone call?:   Patient would prefer a phone call   Okay to leave a detailed message?: Yes at Cell number on file:    Telephone Information:   Mobile [phone]

## (undated) DEVICE — SUCTION MANIFOLD NEPTUNE 2 SYS 1 PORT 702-025-000

## (undated) DEVICE — TUBING SUCTION MEDI-VAC 1/4"X20' N620A - HE

## (undated) DEVICE — BIOPSY FORCEPS

## (undated) DEVICE — SOL WATER IRRIG 1000ML BOTTLE 2F7114

## (undated) RX ORDER — ONDANSETRON 2 MG/ML
INJECTION INTRAMUSCULAR; INTRAVENOUS
Status: DISPENSED
Start: 2022-12-22

## (undated) RX ORDER — PROPOFOL 10 MG/ML
INJECTION, EMULSION INTRAVENOUS
Status: DISPENSED
Start: 2022-12-22

## (undated) RX ORDER — LIDOCAINE HYDROCHLORIDE 10 MG/ML
INJECTION, SOLUTION EPIDURAL; INFILTRATION; INTRACAUDAL; PERINEURAL
Status: DISPENSED
Start: 2022-12-22